# Patient Record
Sex: MALE | Race: WHITE | NOT HISPANIC OR LATINO | URBAN - METROPOLITAN AREA
[De-identification: names, ages, dates, MRNs, and addresses within clinical notes are randomized per-mention and may not be internally consistent; named-entity substitution may affect disease eponyms.]

---

## 2018-05-22 ENCOUNTER — OFFICE (OUTPATIENT)
Dept: URBAN - METROPOLITAN AREA CLINIC 75 | Facility: CLINIC | Age: 18
End: 2018-05-22

## 2018-05-22 VITALS
WEIGHT: 186 LBS | SYSTOLIC BLOOD PRESSURE: 120 MMHG | HEART RATE: 61 BPM | DIASTOLIC BLOOD PRESSURE: 60 MMHG | HEIGHT: 74 IN

## 2018-05-22 DIAGNOSIS — R11.2 NAUSEA WITH VOMITING, UNSPECIFIED: ICD-10-CM

## 2018-05-22 DIAGNOSIS — R10.13 EPIGASTRIC PAIN: ICD-10-CM

## 2018-05-22 DIAGNOSIS — Z83.71 FAMILY HISTORY OF COLONIC POLYPS: ICD-10-CM

## 2018-05-22 PROCEDURE — 99205 OFFICE O/P NEW HI 60 MIN: CPT | Performed by: INTERNAL MEDICINE

## 2018-05-22 NOTE — SERVICEHPINOTES
this 18-year-old male has a history over the last 2 years of worsening symptoms of nausea vomiting and diarrhea.  His nausea is worse in a.m. when he first wakes up as his vomiting and he does feel better after that.  Most times she brings up just bile but he occasionally will bring up old undigested food.  He also feels better if he eats something after this was started.  This is gone for about once a month to one to 2 times a week and usually tried antacids and no other medications thus far.  He also will have loose bowel movements described as diarrhea and when he awakens he will have loose watery bowel movements that he describes as a [place] type VII and then later in the day they will be a [place] type 3 or 4. he otherwise denies weight loss and he stays within a 15 pound range.  He has no significant past medical history is ever been evaluated for this.  Pertinent positive symptoms include fatigue, loss of appetite, weight loss, diarrhea, nausea, vomiting.

## 2018-05-22 NOTE — SERVICENOTES
PPI or other therapy pending endoscopic findings
consider GES, ultrasound, HIDA scan pending above findings

## 2018-07-10 VITALS
HEART RATE: 48 BPM | SYSTOLIC BLOOD PRESSURE: 96 MMHG | OXYGEN SATURATION: 92 % | RESPIRATION RATE: 18 BRPM | DIASTOLIC BLOOD PRESSURE: 65 MMHG | OXYGEN SATURATION: 94 % | RESPIRATION RATE: 14 BRPM | TEMPERATURE: 98 F | SYSTOLIC BLOOD PRESSURE: 115 MMHG | SYSTOLIC BLOOD PRESSURE: 122 MMHG | DIASTOLIC BLOOD PRESSURE: 77 MMHG | TEMPERATURE: 97.8 F | OXYGEN SATURATION: 97 % | RESPIRATION RATE: 20 BRPM | SYSTOLIC BLOOD PRESSURE: 103 MMHG | HEART RATE: 62 BPM | RESPIRATION RATE: 15 BRPM | OXYGEN SATURATION: 99 % | HEART RATE: 61 BPM | RESPIRATION RATE: 16 BRPM | SYSTOLIC BLOOD PRESSURE: 90 MMHG | SYSTOLIC BLOOD PRESSURE: 138 MMHG | WEIGHT: 185 LBS | HEART RATE: 59 BPM | SYSTOLIC BLOOD PRESSURE: 102 MMHG | DIASTOLIC BLOOD PRESSURE: 62 MMHG | DIASTOLIC BLOOD PRESSURE: 57 MMHG | HEIGHT: 74 IN | DIASTOLIC BLOOD PRESSURE: 64 MMHG | DIASTOLIC BLOOD PRESSURE: 69 MMHG | DIASTOLIC BLOOD PRESSURE: 58 MMHG | SYSTOLIC BLOOD PRESSURE: 95 MMHG | HEART RATE: 74 BPM | HEART RATE: 76 BPM | DIASTOLIC BLOOD PRESSURE: 73 MMHG | HEART RATE: 71 BPM | OXYGEN SATURATION: 100 % | SYSTOLIC BLOOD PRESSURE: 126 MMHG | RESPIRATION RATE: 19 BRPM | OXYGEN SATURATION: 98 % | HEART RATE: 104 BPM

## 2018-07-13 ENCOUNTER — AMBULATORY SURGICAL CENTER (OUTPATIENT)
Dept: URBAN - METROPOLITAN AREA SURGERY 17 | Facility: SURGERY | Age: 18
End: 2018-07-13
Payer: COMMERCIAL

## 2018-07-13 ENCOUNTER — OFFICE (OUTPATIENT)
Dept: URBAN - METROPOLITAN AREA PATHOLOGY 4 | Facility: PATHOLOGY | Age: 18
End: 2018-07-13

## 2018-07-13 DIAGNOSIS — R10.13 EPIGASTRIC PAIN: ICD-10-CM

## 2018-07-13 DIAGNOSIS — Z83.71 FAMILY HISTORY OF COLONIC POLYPS: ICD-10-CM

## 2018-07-13 DIAGNOSIS — R11.2 NAUSEA WITH VOMITING, UNSPECIFIED: ICD-10-CM

## 2018-07-13 DIAGNOSIS — R19.7 DIARRHEA, UNSPECIFIED: ICD-10-CM

## 2018-07-13 LAB
GI HISTOLOGY: A. UNSPECIFIED: (no result)
GI HISTOLOGY: B. UNSPECIFIED: (no result)
GI HISTOLOGY: C. SELECT: (no result)
GI HISTOLOGY: PDF REPORT: (no result)

## 2018-07-13 PROCEDURE — 45380 COLONOSCOPY AND BIOPSY: CPT | Performed by: INTERNAL MEDICINE

## 2018-07-13 PROCEDURE — 43239 EGD BIOPSY SINGLE/MULTIPLE: CPT | Performed by: INTERNAL MEDICINE

## 2018-07-13 PROCEDURE — 88305 TISSUE EXAM BY PATHOLOGIST: CPT | Performed by: INTERNAL MEDICINE

## 2018-07-13 RX ADMIN — PROPOFOL 50 MG: 10 INJECTION, EMULSION INTRAVENOUS at 12:08

## 2018-07-13 RX ADMIN — PROPOFOL 50 MG: 10 INJECTION, EMULSION INTRAVENOUS at 12:17

## 2018-07-13 RX ADMIN — PROPOFOL 50 MG: 10 INJECTION, EMULSION INTRAVENOUS at 12:09

## 2018-07-13 RX ADMIN — PROPOFOL 200 MG: 10 INJECTION, EMULSION INTRAVENOUS at 11:59

## 2018-07-13 RX ADMIN — PROPOFOL 100 MG: 10 INJECTION, EMULSION INTRAVENOUS at 11:59

## 2018-08-01 ENCOUNTER — OFFICE (OUTPATIENT)
Dept: URBAN - METROPOLITAN AREA CLINIC 75 | Facility: CLINIC | Age: 18
End: 2018-08-01

## 2018-08-01 VITALS
SYSTOLIC BLOOD PRESSURE: 162 MMHG | HEART RATE: 50 BPM | DIASTOLIC BLOOD PRESSURE: 60 MMHG | WEIGHT: 180 LBS | HEIGHT: 74 IN

## 2018-08-01 DIAGNOSIS — R10.13 EPIGASTRIC PAIN: ICD-10-CM

## 2018-08-01 DIAGNOSIS — R19.7 DIARRHEA, UNSPECIFIED: ICD-10-CM

## 2018-08-01 DIAGNOSIS — R63.4 ABNORMAL WEIGHT LOSS: ICD-10-CM

## 2018-08-01 DIAGNOSIS — R53.83 OTHER FATIGUE: ICD-10-CM

## 2018-08-01 DIAGNOSIS — R11.2 NAUSEA WITH VOMITING, UNSPECIFIED: ICD-10-CM

## 2018-08-01 PROCEDURE — 99214 OFFICE O/P EST MOD 30 MIN: CPT

## 2018-08-01 RX ORDER — HYOSCYAMINE SULFATE 0.12 MG/1
TABLET ORAL; SUBLINGUAL
Qty: 30 | Refills: 1 | Status: COMPLETED
Start: 2018-08-01 | End: 2018-10-17

## 2018-10-17 ENCOUNTER — OFFICE (OUTPATIENT)
Dept: URBAN - METROPOLITAN AREA CLINIC 75 | Facility: CLINIC | Age: 18
End: 2018-10-17

## 2018-10-17 VITALS
SYSTOLIC BLOOD PRESSURE: 122 MMHG | DIASTOLIC BLOOD PRESSURE: 70 MMHG | HEART RATE: 80 BPM | WEIGHT: 178 LBS | HEIGHT: 74 IN

## 2018-10-17 DIAGNOSIS — R63.4 ABNORMAL WEIGHT LOSS: ICD-10-CM

## 2018-10-17 DIAGNOSIS — K31.84 GASTROPARESIS: ICD-10-CM

## 2018-10-17 DIAGNOSIS — R10.13 EPIGASTRIC PAIN: ICD-10-CM

## 2018-10-17 DIAGNOSIS — R19.7 DIARRHEA, UNSPECIFIED: ICD-10-CM

## 2018-10-17 DIAGNOSIS — R11.2 NAUSEA WITH VOMITING, UNSPECIFIED: ICD-10-CM

## 2018-10-17 PROCEDURE — 99213 OFFICE O/P EST LOW 20 MIN: CPT

## 2018-10-17 RX ORDER — HYOSCYAMINE SULFATE 0.12 MG/1
TABLET ORAL; SUBLINGUAL
Qty: 30 | Refills: 1 | Status: COMPLETED
Start: 2018-08-01 | End: 2018-10-17

## 2018-10-17 RX ORDER — PANTOPRAZOLE SODIUM 40 MG/1
40 TABLET, DELAYED RELEASE ORAL
Qty: 30 | Refills: 1 | Status: ACTIVE
Start: 2018-10-17

## 2018-10-17 RX ORDER — AZITHROMYCIN 250 MG/1
500 TABLET, FILM COATED ORAL
Qty: 28 | Refills: 0 | Status: ACTIVE
Start: 2018-10-17

## 2018-10-17 RX ORDER — ONDANSETRON 4 MG/1
12 TABLET, ORALLY DISINTEGRATING ORAL
Qty: 30 | Refills: 1 | Status: ACTIVE
Start: 2018-10-17

## 2022-09-13 ENCOUNTER — APPOINTMENT (OUTPATIENT)
Dept: MRI IMAGING | Facility: HOSPITAL | Age: 22
End: 2022-09-13

## 2022-09-13 ENCOUNTER — HOSPITAL ENCOUNTER (EMERGENCY)
Facility: HOSPITAL | Age: 22
Discharge: HOME | End: 2022-09-13
Attending: EMERGENCY MEDICINE | Admitting: EMERGENCY MEDICINE

## 2022-09-13 VITALS
DIASTOLIC BLOOD PRESSURE: 80 MMHG | RESPIRATION RATE: 16 BRPM | SYSTOLIC BLOOD PRESSURE: 123 MMHG | TEMPERATURE: 97 F | WEIGHT: 177 LBS | BODY MASS INDEX: 23.46 KG/M2 | HEART RATE: 78 BPM | OXYGEN SATURATION: 99 % | HEIGHT: 73 IN

## 2022-09-13 DIAGNOSIS — R56.9 SEIZURE: Primary | ICD-10-CM

## 2022-09-13 LAB
AMPHET+METHAMPHET UR QL: NEGATIVE
ANION GAP SERPL CALCULATED.3IONS-SCNC: 13 MMOL/L (ref 5–15)
BARBITURATES UR QL SCN: NEGATIVE
BASOPHILS # BLD AUTO: 0 10*3/MM3 (ref 0–0.2)
BASOPHILS NFR BLD AUTO: 0.3 % (ref 0–1.5)
BENZODIAZ UR QL SCN: NEGATIVE
BUN SERPL-MCNC: 16 MG/DL (ref 6–20)
BUN/CREAT SERPL: 17 (ref 7–25)
CALCIUM SPEC-SCNC: 10 MG/DL (ref 8.6–10.5)
CANNABINOIDS SERPL QL: POSITIVE
CHLORIDE SERPL-SCNC: 99 MMOL/L (ref 98–107)
CO2 SERPL-SCNC: 26 MMOL/L (ref 22–29)
COCAINE UR QL: NEGATIVE
CREAT SERPL-MCNC: 0.94 MG/DL (ref 0.76–1.27)
DEPRECATED RDW RBC AUTO: 42.4 FL (ref 37–54)
EGFRCR SERPLBLD CKD-EPI 2021: 116.1 ML/MIN/1.73
EOSINOPHIL # BLD AUTO: 0 10*3/MM3 (ref 0–0.4)
EOSINOPHIL NFR BLD AUTO: 0.1 % (ref 0.3–6.2)
ERYTHROCYTE [DISTWIDTH] IN BLOOD BY AUTOMATED COUNT: 13.4 % (ref 12.3–15.4)
GLUCOSE SERPL-MCNC: 123 MG/DL (ref 65–99)
HCT VFR BLD AUTO: 45.5 % (ref 37.5–51)
HGB BLD-MCNC: 15.2 G/DL (ref 13–17.7)
LYMPHOCYTES # BLD AUTO: 1.2 10*3/MM3 (ref 0.7–3.1)
LYMPHOCYTES NFR BLD AUTO: 7.4 % (ref 19.6–45.3)
MCH RBC QN AUTO: 30.7 PG (ref 26.6–33)
MCHC RBC AUTO-ENTMCNC: 33.4 G/DL (ref 31.5–35.7)
MCV RBC AUTO: 91.9 FL (ref 79–97)
METHADONE UR QL SCN: NEGATIVE
MONOCYTES # BLD AUTO: 0.9 10*3/MM3 (ref 0.1–0.9)
MONOCYTES NFR BLD AUTO: 5.7 % (ref 5–12)
NEUTROPHILS NFR BLD AUTO: 14.1 10*3/MM3 (ref 1.7–7)
NEUTROPHILS NFR BLD AUTO: 86.5 % (ref 42.7–76)
NRBC BLD AUTO-RTO: 0 /100 WBC (ref 0–0.2)
OPIATES UR QL: NEGATIVE
OXYCODONE UR QL SCN: NEGATIVE
PLATELET # BLD AUTO: 333 10*3/MM3 (ref 140–450)
PMV BLD AUTO: 7.3 FL (ref 6–12)
POTASSIUM SERPL-SCNC: 3.9 MMOL/L (ref 3.5–5.2)
RBC # BLD AUTO: 4.95 10*6/MM3 (ref 4.14–5.8)
SODIUM SERPL-SCNC: 138 MMOL/L (ref 136–145)
WBC NRBC COR # BLD: 16.3 10*3/MM3 (ref 3.4–10.8)

## 2022-09-13 PROCEDURE — 80307 DRUG TEST PRSMV CHEM ANLYZR: CPT | Performed by: EMERGENCY MEDICINE

## 2022-09-13 PROCEDURE — A9579 GAD-BASE MR CONTRAST NOS,1ML: HCPCS | Performed by: EMERGENCY MEDICINE

## 2022-09-13 PROCEDURE — 0 LEVETIRACETAM IN NACL 0.75% 1000 MG/100ML SOLUTION: Performed by: EMERGENCY MEDICINE

## 2022-09-13 PROCEDURE — 25010000002 KETOROLAC TROMETHAMINE PER 15 MG: Performed by: EMERGENCY MEDICINE

## 2022-09-13 PROCEDURE — 80048 BASIC METABOLIC PNL TOTAL CA: CPT | Performed by: EMERGENCY MEDICINE

## 2022-09-13 PROCEDURE — 96375 TX/PRO/DX INJ NEW DRUG ADDON: CPT

## 2022-09-13 PROCEDURE — 25010000002 GADOTERIDOL PER 1 ML: Performed by: EMERGENCY MEDICINE

## 2022-09-13 PROCEDURE — 99283 EMERGENCY DEPT VISIT LOW MDM: CPT

## 2022-09-13 PROCEDURE — 96374 THER/PROPH/DIAG INJ IV PUSH: CPT

## 2022-09-13 PROCEDURE — 85025 COMPLETE CBC W/AUTO DIFF WBC: CPT | Performed by: EMERGENCY MEDICINE

## 2022-09-13 PROCEDURE — 70553 MRI BRAIN STEM W/O & W/DYE: CPT

## 2022-09-13 RX ORDER — KETOROLAC TROMETHAMINE 30 MG/ML
30 INJECTION, SOLUTION INTRAMUSCULAR; INTRAVENOUS ONCE
Status: COMPLETED | OUTPATIENT
Start: 2022-09-13 | End: 2022-09-13

## 2022-09-13 RX ORDER — LEVETIRACETAM 10 MG/ML
1000 INJECTION INTRAVASCULAR ONCE
Status: COMPLETED | OUTPATIENT
Start: 2022-09-13 | End: 2022-09-13

## 2022-09-13 RX ORDER — SODIUM CHLORIDE 0.9 % (FLUSH) 0.9 %
10 SYRINGE (ML) INJECTION AS NEEDED
Status: DISCONTINUED | OUTPATIENT
Start: 2022-09-13 | End: 2022-09-13 | Stop reason: HOSPADM

## 2022-09-13 RX ORDER — ONDANSETRON 4 MG/1
4 TABLET, ORALLY DISINTEGRATING ORAL EVERY 8 HOURS PRN
Qty: 10 TABLET | Refills: 0 | Status: SHIPPED | OUTPATIENT
Start: 2022-09-13

## 2022-09-13 RX ORDER — LEVETIRACETAM 500 MG/1
500 TABLET ORAL 2 TIMES DAILY
Qty: 60 TABLET | Refills: 0 | Status: SHIPPED | OUTPATIENT
Start: 2022-09-13 | End: 2022-09-20 | Stop reason: SDUPTHER

## 2022-09-13 RX ADMIN — GADOTERIDOL 17 ML: 279.3 INJECTION, SOLUTION INTRAVENOUS at 16:43

## 2022-09-13 RX ADMIN — KETOROLAC TROMETHAMINE 30 MG: 30 INJECTION, SOLUTION INTRAMUSCULAR at 14:23

## 2022-09-13 RX ADMIN — LEVETIRACETAM 1000 MG: 10 INJECTION INTRAVENOUS at 16:55

## 2022-09-13 NOTE — ED PROVIDER NOTES
Subjective   PIT    Patient is a 22-year-old male who had a witnessed tonic-clonic seizure this morning lasting 5 to 7 minutes per his girlfriend.  The patient had a prolonged postictal period lasting approximately an hour.  He complains of severe headache at this time.  Patient apparently had a probable seizure 1 month ago which was not witnessed but he did have a postictal period at that time.  Was seen at an outlying hospital and had a normal evaluation, normal CT scan of his head and normal laboratory data.          Review of Systems   Constitutional: Negative for chills and fever.   HENT: Negative for congestion and sore throat.    Eyes: Negative for visual disturbance.   Respiratory: Negative for cough and shortness of breath.    Cardiovascular: Negative for chest pain and palpitations.   Gastrointestinal: Negative for abdominal pain, diarrhea and vomiting.   Endocrine: Negative for polyuria.   Genitourinary: Negative for dysuria and flank pain.   Musculoskeletal: Negative for back pain.   Neurological: Positive for headaches. Negative for dizziness and weakness.   Psychiatric/Behavioral: Positive for confusion.   A complete review of systems was obtained and is otherwise negative    No past medical history on file.    No Known Allergies    No past surgical history on file.    No family history on file.    Social History     Socioeconomic History   • Marital status: Single           Objective   Physical Exam  Neurologic exam is nonfocal.  HEENT exam shows TMs to be clear  Oropharynx is clear and moist.  Neck adenopathy JVD or bruits.  Lungs clear.  Heart has regular rhythm without murmur.  Abdomen is soft nontender.  Patient has normal bowel sounds.  Back no CVA tenderness.  Extremities M is no cyanosis or edema.  Procedures           ED Course  ED Course as of 09/13/22 1859 Tue Sep 13, 2022   1716 Patient resting, no acute distress.  He is talkative.  We discussed discharge plan of care. [LB]      ED Course  User Index  [LB] Yashira Correa, APRN      Results for orders placed or performed during the hospital encounter of 09/13/22   Basic Metabolic Panel    Specimen: Blood   Result Value Ref Range    Glucose 123 (H) 65 - 99 mg/dL    BUN 16 6 - 20 mg/dL    Creatinine 0.94 0.76 - 1.27 mg/dL    Sodium 138 136 - 145 mmol/L    Potassium 3.9 3.5 - 5.2 mmol/L    Chloride 99 98 - 107 mmol/L    CO2 26.0 22.0 - 29.0 mmol/L    Calcium 10.0 8.6 - 10.5 mg/dL    BUN/Creatinine Ratio 17.0 7.0 - 25.0    Anion Gap 13.0 5.0 - 15.0 mmol/L    eGFR 116.1 >60.0 mL/min/1.73   Urine Drug Screen - Urine, Clean Catch    Specimen: Urine, Clean Catch   Result Value Ref Range    Amphet/Methamphet, Screen Negative Negative    Barbiturates Screen, Urine Negative Negative    Benzodiazepine Screen, Urine Negative Negative    Cocaine Screen, Urine Negative Negative    Opiate Screen Negative Negative    THC, Screen, Urine Positive (A) Negative    Methadone Screen, Urine Negative Negative    Oxycodone Screen, Urine Negative Negative   CBC Auto Differential    Specimen: Blood   Result Value Ref Range    WBC 16.30 (H) 3.40 - 10.80 10*3/mm3    RBC 4.95 4.14 - 5.80 10*6/mm3    Hemoglobin 15.2 13.0 - 17.7 g/dL    Hematocrit 45.5 37.5 - 51.0 %    MCV 91.9 79.0 - 97.0 fL    MCH 30.7 26.6 - 33.0 pg    MCHC 33.4 31.5 - 35.7 g/dL    RDW 13.4 12.3 - 15.4 %    RDW-SD 42.4 37.0 - 54.0 fl    MPV 7.3 6.0 - 12.0 fL    Platelets 333 140 - 450 10*3/mm3    Neutrophil % 86.5 (H) 42.7 - 76.0 %    Lymphocyte % 7.4 (L) 19.6 - 45.3 %    Monocyte % 5.7 5.0 - 12.0 %    Eosinophil % 0.1 (L) 0.3 - 6.2 %    Basophil % 0.3 0.0 - 1.5 %    Neutrophils, Absolute 14.10 (H) 1.70 - 7.00 10*3/mm3    Lymphocytes, Absolute 1.20 0.70 - 3.10 10*3/mm3    Monocytes, Absolute 0.90 0.10 - 0.90 10*3/mm3    Eosinophils, Absolute 0.00 0.00 - 0.40 10*3/mm3    Basophils, Absolute 0.00 0.00 - 0.20 10*3/mm3    nRBC 0.0 0.0 - 0.2 /100 WBC                                          MDM  Number of Diagnoses or  Management Options  Diagnosis management comments: Patient has nonfocal neurologic exam.  Laboratory data is normal.  I did speak to the on-call neurologist.  Patient will have an MRI scan of his brain before discharge provided his MRI is normal.  He was given Keppra IV and was placed on Keppra outpatient as well.  Will follow with outpatient neurology again pending his MRI scan at this time.       Amount and/or Complexity of Data Reviewed  Clinical lab tests: reviewed    Risk of Complications, Morbidity, and/or Mortality  Presenting problems: high  Diagnostic procedures: high  Management options: high    Assumed care patient previous provider.  MRI pending.  MRI reveals no acute normality.  Patient is awake alert without focal neurological deficit.  Discussed discharge instruction, plan of care, home medications and follow-up with patient and family at the bedside.  They verbalized understanding.  They will follow-up with neurology.  I spoke with the patient at the bedside regarding their plan of care, discharge instruction, home care, prescriptions, indications to return to the emergency department, and importance follow-up.  We discussed test results at the bedside, including incidental abnormal labs, radiological findings, understands need for follow-up with primary care or specialist if indicated. Pt is aware that discharge does not mean that nothing is wrong but it indicates no emergency is present and they must continue care with follow-up as given below or physician of their choice        Final diagnoses:   Seizure (HCC)       ED Disposition  ED Disposition     ED Disposition   Discharge    Condition   Stable    Comment   --             [facility] EMERGENCY DEPARTMENT  [facility] [address] [phone]    As needed, If symptoms worsen    Vicente Hill MD  [address] [address] [phone]          Seipel, Joseph F, MD  [facility]  [place] [address]  [phone]               Medication List      New Prescriptions    levETIRAcetam 500 MG tablet  Commonly known as: KEPPRA  Take 1 tablet by mouth 2 (Two) Times a Day.     ondansetron ODT 4 MG disintegrating tablet  Commonly known as: ZOFRAN-ODT  Place 1 tablet on the tongue Every 8 (Eight) Hours As Needed for Nausea or Vomiting.           Where to Get Your Medications      These medications were sent to [facility]/pharmacy #[id number] - [place], [place] - [address] AT [facility] - [phone]  - [phone]   [address] [address]    Phone: [phone]   · levETIRAcetam 500 MG tablet  · ondansetron ODT 4 MG disintegrating tablet          Yashira Correa, APRN  09/13/22 0322

## 2022-09-13 NOTE — DISCHARGE INSTRUCTIONS
Please follow-up with your primary care provider, if you not have a primary care provider please utilize patient connection above to establish care  Return to the ED for new or worsening symptoms  Follow up with Specialist if indicated above-call for an appointment-call for neurology, call for appointment as above  No driving, antibiotics, swimming until cleared per neurology, and per [facility] regulations.

## 2022-09-19 ENCOUNTER — TELEPHONE (OUTPATIENT)
Dept: NEUROLOGY | Facility: CLINIC | Age: 22
End: 2022-09-19

## 2022-09-20 ENCOUNTER — OFFICE VISIT (OUTPATIENT)
Dept: NEUROLOGY | Facility: CLINIC | Age: 22
End: 2022-09-20

## 2022-09-20 VITALS
SYSTOLIC BLOOD PRESSURE: 118 MMHG | BODY MASS INDEX: 24.65 KG/M2 | WEIGHT: 186 LBS | HEIGHT: 73 IN | HEART RATE: 72 BPM | DIASTOLIC BLOOD PRESSURE: 78 MMHG | OXYGEN SATURATION: 96 %

## 2022-09-20 DIAGNOSIS — R56.9 NEW ONSET SEIZURE: Primary | ICD-10-CM

## 2022-09-20 PROCEDURE — 99204 OFFICE O/P NEW MOD 45 MIN: CPT | Performed by: PSYCHIATRY & NEUROLOGY

## 2022-09-20 RX ORDER — LEVETIRACETAM 500 MG/1
500 TABLET ORAL 2 TIMES DAILY
Qty: 60 TABLET | Refills: 2 | Status: SHIPPED | OUTPATIENT
Start: 2022-09-20 | End: 2022-12-27 | Stop reason: SDUPTHER

## 2022-09-20 NOTE — PATIENT INSTRUCTIONS
[facility]  Zaira Hoyt MD  Neurology clinic  [phone]    With anti-seizure medications, you may initially notice side effects of fatigue, drowsiness, unsteadiness, and dizziness.  Other possible side effects include nausea, abdominal pain, headache, blurry or double vision, slurred speech and mood changes.  Generally, patients will noticed these symptoms when the medication is first started or with higher doses and will go away with time.    It is import to consistently take your medication every day.  Missing just one dose may put you at risk for a breakthrough seizure.  Consider using reminders on your phone or a pill box.    If you develop a rash, please call the neurology clinic immediately or notify another healthcare professional, as this may be potentially life-threatening.  If you are unable to reach a healthcare professional, go to the emergency room immediately for further evaluation.    If you develop thoughts of wanting to hurt yourself or others, please call the neurology clinic immediately to notify another healthcare professional.  If you are unable to reach a healthcare professional, go to the emergency room immediately for further evaluation.    It is the [place] state law that you cannot drive within 90 days of a seizure.    You should avoid certain activities that if you were to have a seizure, you could harm yourself or others. In general, it is recommended that you avoid operating heavy machinery or power tools, swimming or taking baths by yourself (showers are ok), don't stand over open flames, don't get on high ladders or the roof.  I also recommend to avoid sleeping on your stomach.    For further information on epilepsy and resources available to patients and their families, please visit the [facility] at www.efky.org or call [phone].    **Check out the [facility]'s monthly Art Group Gathering.  They are located  at [facility], [address] [place] [address].  Call Leigh Ann Reaves at [phone] or email her at [email or web address] for the dates of future gatherings.**      **If you have having memory problems, consider HOBSCOTCH (Home-Based Self-management and Cognitive Training Changes lives).  It is an 8 week self-management program for adults with epilepsy and memory problems.  The program is free at the [facility].  Contact Charlene Bajwa at [phone] or [email or web address].**         In general, we recommend using good judgement when you are doing certain activities and to avoid those activities that if you were to have a seizure, you could harm yourself or others. In the [facility], it is the law that you cannot drive within 90 days of a seizure. We also recommend not standing over open flames, not getting on high ladders or the roof, not swimming or taking baths by yourself (showers are ok) and not operating heavy machinery or power tools.

## 2022-09-20 NOTE — PROGRESS NOTES
Chief Complaint  Patient presents today with their mother, Shaan and has given consent to give health information to them.   Seizures    Subjective          Iván Butler presents to [facility] NEUROLOGY for   HISTORY OF PRESENT ILLNESS:    Iván Butler is a 22 year old right handed man who presents with his mother, Shaan, to neurology clinic for initial evaluation and treatment of new onset seizures.  He tells me his symptoms start in the morning with an intense anxiety and dejavu sensation and he feels like he has some weird nauseated sensation in his stomach which may have started since he was in 3rd grade with morning vomiting and anxiety.  Last Tuesday and 3 weeks before that on August 9th his girl friend noticed that he will be having staring spells after which he will be confused and can be aggressive.  He had a brain MRI scan done on 9/13/2022 which I reviewed the images independently on his visit today and looks normal.  He has not had an EEG done.  He was started on levetiracetam 500 mg BID which he is now taking without any side effects.  He still has some trouble with getting words out and forming sentences since his seizure.  He tells me he has this underlying anxiety and he has had light sensitivity.  There is no family history of seizures in his immediate family but Shaan has a cousin who was diagnosed with epilepsy as a teenager.  Last Tuesday supposedly patient's girl friend was woken up by patient convulsing or shaking.  He tells me he reportedly lost bladder with this spell.  He does not have any history of childhood febrile seizures.  No history of meningitis or encephalitis.  No history of concussion or brain injury.  He smokes marijuana occasionally.  He drinks 2-3 beers per week.  No increased stress. No lack of sleep.  He tells me both times he had a seizure he was vomiting.      Past Medical History:   Diagnosis Date   • Gastroparesis         Family History   Problem  "Relation Age of Onset   • Dementia Maternal Grandfather         Social History     Socioeconomic History   • Marital status: Single   Tobacco Use   • Smoking status: Never Smoker   • Smokeless tobacco: Never Used   Substance and Sexual Activity   • Alcohol use: Yes     Comment: social        I have reviewed and confirmed the accuracy of the ROS as documented by the MA/LPN/RN Zaira Hoyt MD    Review of Systems   Constitutional: Negative for activity change, appetite change and fatigue.   HENT: Negative for facial swelling, trouble swallowing and voice change.    Eyes: Positive for photophobia. Negative for pain and visual disturbance.   Respiratory: Negative for chest tightness, shortness of breath and wheezing.    Cardiovascular: Positive for leg swelling (ankles). Negative for chest pain and palpitations.   Gastrointestinal: Negative for abdominal pain, nausea and vomiting.   Endocrine: Negative for cold intolerance and heat intolerance.   Musculoskeletal: Positive for gait problem. Negative for arthralgias, back pain, joint swelling, myalgias, neck pain and neck stiffness.   Neurological: Positive for memory problem and confusion. Negative for dizziness, tremors, seizures, syncope, facial asymmetry, speech difficulty, weakness, light-headedness, numbness and headache.   Hematological: Does not bruise/bleed easily.   Psychiatric/Behavioral: Positive for positive for hyperactivity and stress. Negative for agitation, behavioral problems, decreased concentration, dysphoric mood, hallucinations, self-injury, sleep disturbance, suicidal ideas and depressed mood. The patient is nervous/anxious.         Objective   Vital Signs:   /78 (BP Location: Left arm, Patient Position: Sitting, Cuff Size: Adult)   Pulse 72   Ht 185.4 cm (73\")   Wt 84.4 kg (186 lb)   SpO2 96%   BMI 24.54 kg/m²       PHYSICAL EXAM:    General   Mental Status - Alert. General Appearance - Well developed, Well groomed, Oriented and " Cooperative. Orientation - Oriented X3.       Head and Neck  Head - normocephalic, atraumatic with no lesions or palpable masses.  Neck    Global Assessment - supple.       Eye   Sclera/Conjunctiva - Bilateral - Normal.    ENMT  Mouth and Throat   Oral Cavity/Oropharynx: Oropharynx - the soft palate,uvula and tongue are normal in appearance.    Chest and Lung Exam   Chest - lung clear to auscultation bilaterally.    Cardiovascular   Cardiovascular examination reveals  - normal heart sounds, regular rate and rhythm.    Neurologic   Mental Status: Speech - Normal. Cognitive function - appropriate fund of knowledge. No impairment of attention, Impairment of concentration, impairment of long term memory or impairment of short term memory.  Cranial Nerves:   II Optic: Visual acuity - Left - Normal. Right - Normal. Visual fields - Normal (to confrontation).  III Oculomotor: Pupillary constriction - Left - Normal. Right - Normal.  VII Facial: - Normal Bilaterally.   IX Glossopharyngeal / X Vagus - Normal.  XI Accessory: Trapezius - Bilateral - Normal. Sternocleidomastoid - Bilateral - Normal.  XII Hypoglossal - Bilateral - Normal.  Eye Movements: - Normal Bilaterally.  Sensory:   Light Touch: Intact - Globally.  Motor:   Bulk and Contour: - Normal.  Tone: - Normal.  Tremor: Not present.  Strength: 5/5 normal muscle strength - All Muscles.   General Assessment of Reflexes: - deep tendon reflexes are normal. Coordination - No Impairment of finger-to-nose or Impairment of rapid alternating movements. Gait - Normal.      Result Review :                 Assessment and Plan    Problem List Items Addressed This Visit        Neuro    New onset seizure (HCC) - Primary    Current Assessment & Plan     22 year old right handed man with new onset seizures.  He tells me his symptoms start in the morning with an intense anxiety and dejavu sensation and he feels like he has some weird nauseated sensation in his stomach which may have  started since he was in 3rd grade with morning vomiting and anxiety.  Last Tuesday and 3 weeks before that on August 9th his girl friend noticed that he will be having staring spells after which he will be confused and can be aggressive.  He had a brain MRI scan done on 9/13/2022 which I reviewed the images independently on his visit today and looks normal.  He has not had an EEG done.  He was started on levetiracetam 500 mg BID which he is now taking without any side effects.  He still has some trouble with getting words out and forming sentences since his seizure.  He tells me he has this underlying anxiety and he has had light sensitivity.  There is no family history of seizures in his immediate family but Shaan has a cousin who was diagnosed with epilepsy as a teenager.  Last Tuesday supposedly patient's girl friend was woken up by patient convulsing or shaking.  He tells me he reportedly lost bladder with this spell.  He does not have any history of childhood febrile seizures.  No history of meningitis or encephalitis.  No history of concussion or brain injury.  He smokes marijuana occasionally.  He drinks 2-3 beers per week.  No increased stress. No lack of sleep.  He tells me both times he had a seizure he was vomiting.  I will order a prolonged EEG for further evaluation of his underlying risk for epilepsy.  If I do not see anything I will refer him to have EMU evaluation where his seizure medications can be safely tapered to see if he has any risk for epilepsy.  Discussed seizure precautions including not driving for at least 3 months following his most recent seizure, taking showers as opposed to baths and staying off of elevated places and heights.  Provided patient education information on seizure today.          Relevant Medications    levETIRAcetam (KEPPRA) 500 MG tablet          I spent 45 minutes caring for Iván on this date of service. This time includes time spent by me in the following  activities:preparing for the visit, reviewing tests, obtaining and/or reviewing a separately obtained history, performing a medically appropriate examination and/or evaluation , counseling and educating the patient/family/caregiver, ordering medications, tests, or procedures, documenting information in the medical record, independently interpreting results and communicating that information with the patient/family/caregiver and care coordination    Follow Up   No follow-ups on file.  Patient was given instructions and counseling regarding his condition or for health maintenance advice. Please see specific information pulled into the AVS if appropriate.

## 2022-09-20 NOTE — ASSESSMENT & PLAN NOTE
22 year old right handed man with new onset seizures.  He tells me his symptoms start in the morning with an intense anxiety and dejavu sensation and he feels like he has some weird nauseated sensation in his stomach which may have started since he was in 3rd grade with morning vomiting and anxiety.  Last Tuesday and 3 weeks before that on August 9th his girl friend noticed that he will be having staring spells after which he will be confused and can be aggressive.  He had a brain MRI scan done on 9/13/2022 which I reviewed the images independently on his visit today and looks normal.  He has not had an EEG done.  He was started on levetiracetam 500 mg BID which he is now taking without any side effects.  He still has some trouble with getting words out and forming sentences since his seizure.  He tells me he has this underlying anxiety and he has had light sensitivity.  There is no family history of seizures in his immediate family but Shaan has a cousin who was diagnosed with epilepsy as a teenager.  Last Tuesday supposedly patient's girl friend was woken up by patient convulsing or shaking.  He tells me he reportedly lost bladder with this spell.  He does not have any history of childhood febrile seizures.  No history of meningitis or encephalitis.  No history of concussion or brain injury.  He smokes marijuana occasionally.  He drinks 2-3 beers per week.  No increased stress. No lack of sleep.  He tells me both times he had a seizure he was vomiting.  I will order a prolonged EEG for further evaluation of his underlying risk for epilepsy.  If I do not see anything I will refer him to have EMU evaluation where his seizure medications can be safely tapered to see if he has any risk for epilepsy.  Discussed seizure precautions including not driving for at least 3 months following his most recent seizure, taking showers as opposed to baths and staying off of elevated places and heights.  Provided patient education  information on seizure today.

## 2022-09-27 ENCOUNTER — APPOINTMENT (OUTPATIENT)
Dept: NEUROLOGY | Facility: HOSPITAL | Age: 22
End: 2022-09-27

## 2022-09-28 ENCOUNTER — PATIENT ROUNDING (BHMG ONLY) (OUTPATIENT)
Dept: NEUROLOGY | Facility: CLINIC | Age: 22
End: 2022-09-28

## 2022-10-06 ENCOUNTER — HOSPITAL ENCOUNTER (OUTPATIENT)
Dept: NEUROLOGY | Facility: HOSPITAL | Age: 22
Discharge: HOME | End: 2022-10-06
Admitting: PSYCHIATRY & NEUROLOGY

## 2022-10-06 DIAGNOSIS — R56.9 NEW ONSET SEIZURE: ICD-10-CM

## 2022-10-06 PROCEDURE — 95713 VEEG 2-12 HR CONT MNTR: CPT

## 2022-10-06 PROCEDURE — 95713 VEEG 2-12 HR CONT MNTR: CPT | Performed by: PSYCHIATRY & NEUROLOGY

## 2022-10-12 DIAGNOSIS — R56.9 SEIZURE-LIKE ACTIVITY: Primary | ICD-10-CM

## 2022-12-27 DIAGNOSIS — R56.9 NEW ONSET SEIZURE: ICD-10-CM

## 2022-12-27 RX ORDER — LEVETIRACETAM 500 MG/1
500 TABLET ORAL 2 TIMES DAILY
Qty: 60 TABLET | Refills: 2 | Status: SHIPPED | OUTPATIENT
Start: 2022-12-27 | End: 2023-03-31

## 2023-03-31 DIAGNOSIS — R56.9 NEW ONSET SEIZURE: ICD-10-CM

## 2023-03-31 RX ORDER — LEVETIRACETAM 500 MG/1
TABLET ORAL
Qty: 60 TABLET | Refills: 0 | Status: SHIPPED | OUTPATIENT
Start: 2023-03-31

## 2023-05-05 DIAGNOSIS — R56.9 NEW ONSET SEIZURE: ICD-10-CM

## 2023-05-05 RX ORDER — LEVETIRACETAM 500 MG/1
TABLET ORAL
Qty: 60 TABLET | Refills: 0 | OUTPATIENT
Start: 2023-05-05

## 2023-05-08 ENCOUNTER — TELEPHONE (OUTPATIENT)
Dept: NEUROLOGY | Facility: CLINIC | Age: 23
End: 2023-05-08
Payer: COMMERCIAL

## 2023-05-08 NOTE — TELEPHONE ENCOUNTER
Pt sent MyChart request for a fu appt for seizure management & med refill. Pt was scheduled on Thursday the 11th at 1:40 pm. MyChart response sent confirming appt.

## 2023-05-11 ENCOUNTER — OFFICE VISIT (OUTPATIENT)
Dept: NEUROLOGY | Facility: CLINIC | Age: 23
End: 2023-05-11
Payer: COMMERCIAL

## 2023-05-11 VITALS
HEIGHT: 73 IN | DIASTOLIC BLOOD PRESSURE: 74 MMHG | HEART RATE: 76 BPM | OXYGEN SATURATION: 98 % | BODY MASS INDEX: 24.25 KG/M2 | WEIGHT: 183 LBS | SYSTOLIC BLOOD PRESSURE: 120 MMHG

## 2023-05-11 DIAGNOSIS — R56.9 NEW ONSET SEIZURE: Primary | ICD-10-CM

## 2023-05-11 PROCEDURE — 99214 OFFICE O/P EST MOD 30 MIN: CPT | Performed by: PSYCHIATRY & NEUROLOGY

## 2023-05-11 RX ORDER — ACETAMINOPHEN, ASPIRIN AND CAFFEINE 250; 250; 65 MG/1; MG/1; MG/1
1 TABLET, FILM COATED ORAL EVERY 6 HOURS PRN
COMMUNITY

## 2023-05-11 RX ORDER — LEVETIRACETAM 750 MG/1
1500 TABLET, EXTENDED RELEASE ORAL DAILY
Qty: 60 TABLET | Refills: 2 | Status: SHIPPED | OUTPATIENT
Start: 2023-05-11

## 2023-05-11 NOTE — PROGRESS NOTES
Chief Complaint  Seizures    Subjective          Iván Butler presents to [facility] NEUROLOGY for   HISTORY OF PRESENT ILLNESS:    Iván Butler is a 22 year old right handed man who presents with his girl friend, Deepti, to neurology clinic for follow up evaluation and treatment of new onset seizures.  He tells me his symptoms start in the morning with an intense anxiety and dejavu sensation and he feels like he has some weird nauseated sensation in his stomach which may have started since he was in 3rd grade with morning vomiting and anxiety.  Last Tuesday and 3 weeks before that on August 9th his girl friend noticed that he will be having staring spells after which he will be confused and can be aggressive.  He had a brain MRI scan done on 9/13/2022 which looks normal.  He had a 3 hour prolonged EEG which was normal.  He was started on levetiracetam 500 mg BID which he is now taking with some agitation and feeling on edge and some fatigue.  He still has some trouble with getting words out and forming sentences since his seizure.  He tells me he has this underlying anxiety and he has had light sensitivity.  There is no family history of seizures in his immediate family but Shaan, his mother, has a cousin who was diagnosed with epilepsy as a teenager.  Last Tuesday supposedly patient's girl friend was woken up by patient convulsing or shaking.  He tells me he reportedly lost bladder with this spell.  He does not have any history of childhood febrile seizures.  No history of meningitis or encephalitis.  No history of concussion or brain injury.  He smokes marijuana occasionally.  He drinks 2-3 beers per week.  No increased stress. No lack of sleep.  He tells me both times he had a seizure he was vomiting.  He tells me since his last visit he had another seizure on 5/7/2023 as he woke up feeling anxious and felt like his throat was closing up and felt like he was getting numb all over and he took  "an additional levetiracetam and 3 minutes later he had rhythmic facial movements lasting about a minute and he opened up his eyes and felt confused and felt tired.      Past Medical History:   Diagnosis Date   • Gastroparesis         Family History   Problem Relation Age of Onset   • Dementia Maternal Grandfather         Social History     Socioeconomic History   • Marital status: Single   Tobacco Use   • Smoking status: Never   • Smokeless tobacco: Never   Substance and Sexual Activity   • Alcohol use: Yes     Comment: social        I have reviewed and confirmed the accuracy of the ROS as documented by the MA/LPN/RN Zaira Hoyt MD    Review of Systems   Neurological: Positive for seizures. Negative for dizziness, tremors, syncope, facial asymmetry, speech difficulty, weakness, light-headedness, numbness, headache, memory problem and confusion.   Psychiatric/Behavioral: Positive for agitation. Negative for behavioral problems, decreased concentration, dysphoric mood, hallucinations, self-injury, sleep disturbance, suicidal ideas, negative for hyperactivity, depressed mood and stress. The patient is not nervous/anxious.         Objective   Vital Signs:   /74   Pulse 76   Ht 185.4 cm (72.99\")   Wt 83 kg (183 lb)   SpO2 98%   BMI 24.15 kg/m²       PHYSICAL EXAM:    General   Mental Status - Alert. General Appearance - Well developed, Well groomed, Oriented and Cooperative. Orientation - Oriented X3.       Head and Neck  Head - normocephalic, atraumatic with no lesions or palpable masses.  Neck    Global Assessment - supple.       Eye   Sclera/Conjunctiva - Bilateral - Normal.    ENMT  Mouth and Throat   Oral Cavity/Oropharynx: Oropharynx - the soft palate,uvula and tongue are normal in appearance.    Chest and Lung Exam   Chest - lung clear to auscultation bilaterally.    Cardiovascular   Cardiovascular examination reveals  - normal heart sounds, regular rate and rhythm.    Neurologic   Mental Status: " Speech - Normal. Cognitive function - appropriate fund of knowledge. No impairment of attention, Impairment of concentration, impairment of long term memory or impairment of short term memory.  Cranial Nerves:   II Optic: Visual acuity - Left - Normal. Right - Normal. Visual fields - Normal (to confrontation).  III Oculomotor: Pupillary constriction - Left - Normal. Right - Normal.  VII Facial: - Normal Bilaterally.   IX Glossopharyngeal / X Vagus - Normal.  XI Accessory: Trapezius - Bilateral - Normal. Sternocleidomastoid - Bilateral - Normal.  XII Hypoglossal - Bilateral - Normal.  Eye Movements: - Normal Bilaterally.  Sensory:   Light Touch: Intact - Globally.  Motor:   Bulk and Contour: - Normal.  Tone: - Normal.  Tremor: Not present.  Strength: 5/5 normal muscle strength - All Muscles.   General Assessment of Reflexes: - deep tendon reflexes are normal. Coordination - No Impairment of finger-to-nose or Impairment of rapid alternating movements. Gait - Normal.      Result Review :                 Assessment and Plan    Problem List Items Addressed This Visit        Neuro    New onset seizure - Primary    Current Assessment & Plan     22 year old right handed man with new onset seizures.  He tells me his symptoms start in the morning with an intense anxiety and dejavu sensation and he feels like he has some weird nauseated sensation in his stomach which may have started since he was in 3rd grade with morning vomiting and anxiety.  Last Tuesday and 3 weeks before that on August 9th his girl friend noticed that he will be having staring spells after which he will be confused and can be aggressive.  He had a brain MRI scan done on 9/13/2022 which looks normal.  He had a 3 hour prolonged EEG which was normal.  He was started on levetiracetam 500 mg BID which he is now taking with some agitation and feeling on edge and some fatigue.  He still has some trouble with getting words out and forming sentences since his  seizure.  He tells me he has this underlying anxiety and he has had light sensitivity.  There is no family history of seizures in his immediate family but Shaan, his mother, has a cousin who was diagnosed with epilepsy as a teenager.  Last Tuesday supposedly patient's girl friend was woken up by patient convulsing or shaking.  He tells me he reportedly lost bladder with this spell.  He does not have any history of childhood febrile seizures.  No history of meningitis or encephalitis.  No history of concussion or brain injury.  He smokes marijuana occasionally.  He drinks 2-3 beers per week.  No increased stress. No lack of sleep.  He tells me both times he had a seizure he was vomiting.  He tells me since his last visit he had another seizure on 5/7/2023 as he woke up feeling anxious and felt like his throat was closing up and felt like he was getting numb all over and he took an additional levetiracetam and 3 minutes later he had rhythmic facial movements lasting about a minute and he opened up his eyes and felt confused and felt tired.  I am going to increase his dose of levetiracetam today to 750 mg BID and try the XR formulation but if too expensive can switch the the regular formulation and set him up for prolonged continuous video EEG monitoring at an EMU where his Keppra can be weaned off to get a good baseline read off of ASDs.  Discussed seizure precautions including taking showers as opposed to baths, staying off of elevated places and heights, no driving for 3 months of seizure freedom and no operating heavy machinery.           Relevant Medications    levETIRAcetam (KEPPRA XR) 750 MG tablet sustained-release 24 hour tablet    Other Relevant Orders    EEG Continuous Monitoring With Video       I spent 35 minutes caring for Iván on this date of service. This time includes time spent by me in the following activities:preparing for the visit, reviewing tests, obtaining and/or reviewing a separately  obtained history, performing a medically appropriate examination and/or evaluation , counseling and educating the patient/family/caregiver, ordering medications, tests, or procedures, documenting information in the medical record and care coordination    Follow Up   No follow-ups on file.  Patient was given instructions and counseling regarding his condition or for health maintenance advice. Please see specific information pulled into the AVS if appropriate.

## 2023-05-11 NOTE — PATIENT INSTRUCTIONS
[facility]  Zaira Hoyt MD  Neurology clinic  [phone]    With anti-seizure medications, you may initially notice side effects of fatigue, drowsiness, unsteadiness, and dizziness.  Other possible side effects include nausea, abdominal pain, headache, blurry or double vision, slurred speech and mood changes.  Generally, patients will noticed these symptoms when the medication is first started or with higher doses and will go away with time.    It is import to consistently take your medication every day.  Missing just one dose may put you at risk for a breakthrough seizure.  Consider using reminders on your phone or a pill box.    If you develop a rash, please call the neurology clinic immediately or notify another healthcare professional, as this may be potentially life-threatening.  If you are unable to reach a healthcare professional, go to the emergency room immediately for further evaluation.    If you develop thoughts of wanting to hurt yourself or others, please call the neurology clinic immediately to notify another healthcare professional.  If you are unable to reach a healthcare professional, go to the emergency room immediately for further evaluation.    It is the [place] state law that you cannot drive within 90 days of a seizure.    You should avoid certain activities that if you were to have a seizure, you could harm yourself or others. In general, it is recommended that you avoid operating heavy machinery or power tools, swimming or taking baths by yourself (showers are ok), don't stand over open flames, don't get on high ladders or the roof.  I also recommend to avoid sleeping on your stomach.    For further information on epilepsy and resources available to patients and their families, please visit the [facility] [place] [facility] at www.efky.org or call [phone].    **Check out the [facility] [place]'s monthly Art Group Gathering.  They are located  at [facility], [address] [place] [address].  Call Leigh Ann Reaves at [phone] or email her at [email or web address] for the dates of future gatherings.**      **If you have having memory problems, consider HOBSCOTCH (Home-Based Self-management and Cognitive Training Changes lives).  It is an 8 week self-management program for adults with epilepsy and memory problems.  The program is free at the [facility].  Contact Charlene Bajwa at [phone] or [email or web address].**         In general, we recommend using good judgement when you are doing certain activities and to avoid those activities that if you were to have a seizure, you could harm yourself or others. In the [facility], it is the law that you cannot drive within 90 days of a seizure. We also recommend not standing over open flames, not getting on high ladders or the roof, not swimming or taking baths by yourself (showers are ok) and not operating heavy machinery or power tools.

## 2023-05-11 NOTE — LETTER
May 11, 2023     Iván Butler    Patient: Iván Butler   YOB: 2000   Date of Visit: 5/11/2023       Dear Dr. Butler:    Thank you for referring Iván Butler to me for evaluation. Below are the relevant portions of my assessment and plan of care.    If you have questions, please do not hesitate to call me. I look forward to following Iván along with you.         Sincerely,        Zaira Hoyt MD        CC: No Recipients    Zaira Hoyt MD  05/11/23 1412  Signed  Chief Complaint  Seizures    Subjective           Iván Butler presents to [facility] NEUROLOGY for   HISTORY OF PRESENT ILLNESS:    Iván Butler is a 22 year old right handed man who presents with his girl friend, Deepti, to neurology clinic for follow up evaluation and treatment of new onset seizures.  He tells me his symptoms start in the morning with an intense anxiety and dejavu sensation and he feels like he has some weird nauseated sensation in his stomach which may have started since he was in 3rd grade with morning vomiting and anxiety.  Last Tuesday and 3 weeks before that on August 9th his girl friend noticed that he will be having staring spells after which he will be confused and can be aggressive.  He had a brain MRI scan done on 9/13/2022 which looks normal.  He had a 3 hour prolonged EEG which was normal.  He was started on levetiracetam 500 mg BID which he is now taking with some agitation and feeling on edge and some fatigue.  He still has some trouble with getting words out and forming sentences since his seizure.  He tells me he has this underlying anxiety and he has had light sensitivity.  There is no family history of seizures in his immediate family but Shaan, his mother, has a cousin who was diagnosed with epilepsy as a teenager.  Last Tuesday supposedly patient's girl friend was woken up by patient convulsing or shaking.  He tells me he reportedly lost bladder with this spell.  He  "does not have any history of childhood febrile seizures.  No history of meningitis or encephalitis.  No history of concussion or brain injury.  He smokes marijuana occasionally.  He drinks 2-3 beers per week.  No increased stress. No lack of sleep.  He tells me both times he had a seizure he was vomiting.  He tells me since his last visit he had another seizure on 5/7/2023 as he woke up feeling anxious and felt like his throat was closing up and felt like he was getting numb all over and he took an additional levetiracetam and 3 minutes later he had rhythmic facial movements lasting about a minute and he opened up his eyes and felt confused and felt tired.      Past Medical History:   Diagnosis Date   • Gastroparesis         Family History   Problem Relation Age of Onset   • Dementia Maternal Grandfather         Social History     Socioeconomic History   • Marital status: Single   Tobacco Use   • Smoking status: Never   • Smokeless tobacco: Never   Substance and Sexual Activity   • Alcohol use: Yes     Comment: social        I have reviewed and confirmed the accuracy of the ROS as documented by the MA/LPN/RN Zaira Hoyt MD    Review of Systems   Neurological: Positive for seizures. Negative for dizziness, tremors, syncope, facial asymmetry, speech difficulty, weakness, light-headedness, numbness, headache, memory problem and confusion.   Psychiatric/Behavioral: Positive for agitation. Negative for behavioral problems, decreased concentration, dysphoric mood, hallucinations, self-injury, sleep disturbance, suicidal ideas, negative for hyperactivity, depressed mood and stress. The patient is not nervous/anxious.         Objective    Vital Signs:   /74   Pulse 76   Ht 185.4 cm (72.99\")   Wt 83 kg (183 lb)   SpO2 98%   BMI 24.15 kg/m²       PHYSICAL EXAM:    General   Mental Status - Alert. General Appearance - Well developed, Well groomed, Oriented and Cooperative. Orientation - Oriented X3.       Head " and Neck  Head - normocephalic, atraumatic with no lesions or palpable masses.  Neck    Global Assessment - supple.       Eye   Sclera/Conjunctiva - Bilateral - Normal.    ENMT  Mouth and Throat   Oral Cavity/Oropharynx: Oropharynx - the soft palate,uvula and tongue are normal in appearance.    Chest and Lung Exam   Chest - lung clear to auscultation bilaterally.    Cardiovascular   Cardiovascular examination reveals  - normal heart sounds, regular rate and rhythm.    Neurologic   Mental Status: Speech - Normal. Cognitive function - appropriate fund of knowledge. No impairment of attention, Impairment of concentration, impairment of long term memory or impairment of short term memory.  Cranial Nerves:   II Optic: Visual acuity - Left - Normal. Right - Normal. Visual fields - Normal (to confrontation).  III Oculomotor: Pupillary constriction - Left - Normal. Right - Normal.  VII Facial: - Normal Bilaterally.   IX Glossopharyngeal / X Vagus - Normal.  XI Accessory: Trapezius - Bilateral - Normal. Sternocleidomastoid - Bilateral - Normal.  XII Hypoglossal - Bilateral - Normal.  Eye Movements: - Normal Bilaterally.  Sensory:   Light Touch: Intact - Globally.  Motor:   Bulk and Contour: - Normal.  Tone: - Normal.  Tremor: Not present.  Strength: 5/5 normal muscle strength - All Muscles.   General Assessment of Reflexes: - deep tendon reflexes are normal. Coordination - No Impairment of finger-to-nose or Impairment of rapid alternating movements. Gait - Normal.      Result Review :                Assessment and Plan    Problem List Items Addressed This Visit        Neuro    New onset seizure - Primary    Current Assessment & Plan     22 year old right handed man with new onset seizures.  He tells me his symptoms start in the morning with an intense anxiety and dejavu sensation and he feels like he has some weird nauseated sensation in his stomach which may have started since he was in 3rd grade with morning vomiting and  anxiety.  Last Tuesday and 3 weeks before that on August 9th his girl friend noticed that he will be having staring spells after which he will be confused and can be aggressive.  He had a brain MRI scan done on 9/13/2022 which looks normal.  He had a 3 hour prolonged EEG which was normal.  He was started on levetiracetam 500 mg BID which he is now taking with some agitation and feeling on edge and some fatigue.  He still has some trouble with getting words out and forming sentences since his seizure.  He tells me he has this underlying anxiety and he has had light sensitivity.  There is no family history of seizures in his immediate family but Shaan, his mother, has a cousin who was diagnosed with epilepsy as a teenager.  Last Tuesday supposedly patient's girl friend was woken up by patient convulsing or shaking.  He tells me he reportedly lost bladder with this spell.  He does not have any history of childhood febrile seizures.  No history of meningitis or encephalitis.  No history of concussion or brain injury.  He smokes marijuana occasionally.  He drinks 2-3 beers per week.  No increased stress. No lack of sleep.  He tells me both times he had a seizure he was vomiting.  He tells me since his last visit he had another seizure on 5/7/2023 as he woke up feeling anxious and felt like his throat was closing up and felt like he was getting numb all over and he took an additional levetiracetam and 3 minutes later he had rhythmic facial movements lasting about a minute and he opened up his eyes and felt confused and felt tired.  I am going to increase his dose of levetiracetam today to 750 mg BID and try the XR formulation but if too expensive can switch the the regular formulation and set him up for prolonged continuous video EEG monitoring at an EMU where his Keppra can be weaned off to get a good baseline read off of ASDs.  Discussed seizure precautions including taking showers as opposed to baths, staying off of  elevated places and heights, no driving for 3 months of seizure freedom and no operating heavy machinery.           Relevant Medications    levETIRAcetam (KEPPRA XR) 750 MG tablet sustained-release 24 hour tablet    Other Relevant Orders    EEG Continuous Monitoring With Video       I spent 35 minutes caring for Iván on this date of service. This time includes time spent by me in the following activities:preparing for the visit, reviewing tests, obtaining and/or reviewing a separately obtained history, performing a medically appropriate examination and/or evaluation , counseling and educating the patient/family/caregiver, ordering medications, tests, or procedures, documenting information in the medical record and care coordination    Follow Up   No follow-ups on file.  Patient was given instructions and counseling regarding his condition or for health maintenance advice. Please see specific information pulled into the AVS if appropriate.

## 2023-05-11 NOTE — ASSESSMENT & PLAN NOTE
22 year old right handed man with new onset seizures.  He tells me his symptoms start in the morning with an intense anxiety and dejavu sensation and he feels like he has some weird nauseated sensation in his stomach which may have started since he was in 3rd grade with morning vomiting and anxiety.  Last Tuesday and 3 weeks before that on August 9th his girl friend noticed that he will be having staring spells after which he will be confused and can be aggressive.  He had a brain MRI scan done on 9/13/2022 which looks normal.  He had a 3 hour prolonged EEG which was normal.  He was started on levetiracetam 500 mg BID which he is now taking with some agitation and feeling on edge and some fatigue.  He still has some trouble with getting words out and forming sentences since his seizure.  He tells me he has this underlying anxiety and he has had light sensitivity.  There is no family history of seizures in his immediate family but Shaan, his mother, has a cousin who was diagnosed with epilepsy as a teenager.  Last Tuesday supposedly patient's girl friend was woken up by patient convulsing or shaking.  He tells me he reportedly lost bladder with this spell.  He does not have any history of childhood febrile seizures.  No history of meningitis or encephalitis.  No history of concussion or brain injury.  He smokes marijuana occasionally.  He drinks 2-3 beers per week.  No increased stress. No lack of sleep.  He tells me both times he had a seizure he was vomiting.  He tells me since his last visit he had another seizure on 5/7/2023 as he woke up feeling anxious and felt like his throat was closing up and felt like he was getting numb all over and he took an additional levetiracetam and 3 minutes later he had rhythmic facial movements lasting about a minute and he opened up his eyes and felt confused and felt tired.  I am going to increase his dose of levetiracetam today to 750 mg BID and try the XR formulation but if  too expensive can switch the the regular formulation and set him up for prolonged continuous video EEG monitoring at an EMU where his Keppra can be weaned off to get a good baseline read off of ASDs.  Discussed seizure precautions including taking showers as opposed to baths, staying off of elevated places and heights, no driving for 3 months of seizure freedom and no operating heavy machinery.

## 2023-09-15 DIAGNOSIS — R56.9 NEW ONSET SEIZURE: ICD-10-CM

## 2023-09-15 RX ORDER — LEVETIRACETAM 750 MG/1
TABLET, EXTENDED RELEASE ORAL
Qty: 60 TABLET | Refills: 0 | Status: SHIPPED | OUTPATIENT
Start: 2023-09-15

## 2023-10-17 DIAGNOSIS — R56.9 NEW ONSET SEIZURE: ICD-10-CM

## 2023-10-17 RX ORDER — LEVETIRACETAM 750 MG/1
TABLET, EXTENDED RELEASE ORAL
Qty: 60 TABLET | Refills: 0 | Status: SHIPPED | OUTPATIENT
Start: 2023-10-17

## 2023-11-24 DIAGNOSIS — R56.9 NEW ONSET SEIZURE: ICD-10-CM

## 2023-11-27 RX ORDER — LEVETIRACETAM 750 MG/1
TABLET, FILM COATED, EXTENDED RELEASE ORAL
Qty: 60 TABLET | Refills: 2 | Status: SHIPPED | OUTPATIENT
Start: 2023-11-27

## 2024-02-28 DIAGNOSIS — R56.9 NEW ONSET SEIZURE: ICD-10-CM

## 2024-02-28 RX ORDER — LEVETIRACETAM 750 MG/1
1500 TABLET, FILM COATED, EXTENDED RELEASE ORAL DAILY
Qty: 60 TABLET | Refills: 2 | Status: SHIPPED | OUTPATIENT
Start: 2024-02-28

## 2024-06-06 DIAGNOSIS — R56.9 NEW ONSET SEIZURE: ICD-10-CM

## 2024-06-06 RX ORDER — LEVETIRACETAM 750 MG/1
1500 TABLET, FILM COATED, EXTENDED RELEASE ORAL DAILY
Qty: 60 TABLET | Refills: 0 | OUTPATIENT
Start: 2024-06-06

## 2024-06-13 ENCOUNTER — TELEPHONE (OUTPATIENT)
Dept: NEUROLOGY | Facility: CLINIC | Age: 24
End: 2024-06-13
Payer: COMMERCIAL

## 2024-06-13 NOTE — TELEPHONE ENCOUNTER
Spoke with in regards to setting up a follow appt due to needing medication refills needing to be renewed. Patient stated he could only get off on June 19th, also had stated if they can do a video visit for follow up as they have no availability except for June 19th.

## 2024-06-14 DIAGNOSIS — R56.9 NEW ONSET SEIZURE: ICD-10-CM

## 2024-06-14 RX ORDER — LEVETIRACETAM 750 MG/1
1500 TABLET, FILM COATED, EXTENDED RELEASE ORAL DAILY
Qty: 60 TABLET | Refills: 2 | Status: SHIPPED | OUTPATIENT
Start: 2024-06-14

## 2024-06-14 RX ORDER — LEVETIRACETAM 750 MG/1
1500 TABLET, FILM COATED, EXTENDED RELEASE ORAL DAILY
Qty: 60 TABLET | Refills: 0 | OUTPATIENT
Start: 2024-06-14

## 2024-06-19 ENCOUNTER — OFFICE VISIT (OUTPATIENT)
Dept: NEUROLOGY | Facility: CLINIC | Age: 24
End: 2024-06-19
Payer: COMMERCIAL

## 2024-06-19 VITALS
DIASTOLIC BLOOD PRESSURE: 80 MMHG | SYSTOLIC BLOOD PRESSURE: 130 MMHG | BODY MASS INDEX: 24.12 KG/M2 | WEIGHT: 182 LBS | HEIGHT: 73 IN | HEART RATE: 53 BPM

## 2024-06-19 DIAGNOSIS — R56.9 SEIZURE: Primary | ICD-10-CM

## 2024-06-19 PROCEDURE — 99214 OFFICE O/P EST MOD 30 MIN: CPT | Performed by: PSYCHIATRY & NEUROLOGY

## 2024-06-19 RX ORDER — BARICITINIB 2 MG/1
TABLET, FILM COATED ORAL DAILY
COMMUNITY
Start: 2024-05-30

## 2024-06-19 RX ORDER — LEVETIRACETAM 750 MG/1
1500 TABLET, FILM COATED, EXTENDED RELEASE ORAL DAILY
Qty: 180 TABLET | Refills: 1 | Status: SHIPPED | OUTPATIENT
Start: 2024-06-19

## 2024-06-19 NOTE — ASSESSMENT & PLAN NOTE
24 year old right handed man who returns with his girl friend, Deepti, to neurology clinic for follow up evaluation and treatment of new onset seizures.  He tells me his symptoms start in the morning with an intense anxiety and dejavu sensation and he feels like he has some weird nauseated sensation in his stomach which may have started since he was in 3rd grade with morning vomiting and anxiety.  The tuesday and 3 weeks before that on August 9th his girl friend noticed that he will be having staring spells after which he will be confused and can be aggressive.  He had a brain MRI scan done on 9/13/2022 which looks normal.  He had a 3 hour prolonged EEG which was normal.  He was started on levetiracetam 500 mg BID which caused agitation and feeling on edge and some fatigue but this has improved with the XR formulation and we have increased his dose since last visit to 1500 mg.  He still has some trouble with getting words out and forming sentences since his seizure.  He tells me he has this underlying anxiety and he has had light sensitivity.  There is no family history of seizures in his immediate family but Shaan, his mother, has a cousin who was diagnosed with epilepsy as a teenager.  On the Tuesday supposedly patient's girl friend was woken up by patient convulsing or shaking.  He tells me he reportedly lost bladder with this spell.  He does not have any history of childhood febrile seizures.  No history of meningitis or encephalitis.  No history of concussion or brain injury.  He smokes marijuana occasionally.  He drinks 2-3 beers per week.  No increased stress. No lack of sleep.  He tells me both times he had a seizure he was vomiting.  He had another seizure on 5/7/2023 as he woke up feeling anxious and felt like his throat was closing up and felt like he was getting numb all over and he took an additional levetiracetam and 3 minutes later he had rhythmic facial movements lasting about a minute and he opened up  his eyes and felt confused and felt tired.  He has not had any new seizures since his last visit but may have had a strong aura in 10/2023 when he had COVID 19 but has been doing well since that time and he likes the XR formulation better than the immediate release.  At this time we will continue current dose of levetiracetam XR.  Discussed seizure precautions again and advised him to take his medication as prescribed without missing any doses as epileptic seizures can be potentially life threatening and fatal.  Discussed SUDEP risk.

## 2024-06-19 NOTE — PROGRESS NOTES
Chief Complaint  Seizures (No new sz reports had a strong aura in October denies missed meds . )    Subjective          Iván Butler presents to [facility]UROLOG[facility] for   HISTORY OF PRESENT ILLNESS:    Iván Butler is a 24 year old right handed man who returns with his girl friend, Deepti, to neurology clinic for follow up evaluation and treatment of new onset seizures.  He tells me his symptoms start in the morning with an intense anxiety and dejavu sensation and he feels like he has some weird nauseated sensation in his stomach which may have started since he was in 3rd grade with morning vomiting and anxiety.  The tuesday and 3 weeks before that on August 9th his girl friend noticed that he will be having staring spells after which he will be confused and can be aggressive.  He had a brain MRI scan done on 9/13/2022 which looks normal.  He had a 3 hour prolonged EEG which was normal.  He was started on levetiracetam 500 mg BID which caused agitation and feeling on edge and some fatigue but this has improved with the XR formulation and we have increased his dose since last visit to 1500 mg.  He still has some trouble with getting words out and forming sentences since his seizure.  He tells me he has this underlying anxiety and he has had light sensitivity.  There is no family history of seizures in his immediate family but Shaan, his mother, has a cousin who was diagnosed with epilepsy as a teenager.  On the Tuesday supposedly patient's girl friend was woken up by patient convulsing or shaking.  He tells me he reportedly lost bladder with this spell.  He does not have any history of childhood febrile seizures.  No history of meningitis or encephalitis.  No history of concussion or brain injury.  He smokes marijuana occasionally.  He drinks 2-3 beers per week.  No increased stress. No lack of sleep.  He tells me both times he had a seizure he was vomiting.  He had another seizure on 5/7/2023 as  "he woke up feeling anxious and felt like his throat was closing up and felt like he was getting numb all over and he took an additional levetiracetam and 3 minutes later he had rhythmic facial movements lasting about a minute and he opened up his eyes and felt confused and felt tired.  He has not had any new seizures since his last visit but may have had a strong aura in 10/2023 when he had COVID 19 but has been doing well since that time and he likes the XR formulation better than the immediate release.      Past Medical History:   Diagnosis Date    Gastroparesis         Family History   Problem Relation Age of Onset    Cancer Father     Dementia Maternal Grandfather         Social History     Socioeconomic History    Marital status: Single   Tobacco Use    Smoking status: Never    Smokeless tobacco: Never   Substance and Sexual Activity    Alcohol use: Yes     Comment: social        I have reviewed and confirmed the accuracy of the ROS as documented by the MA/LPN/RN Zaira Hoyt MD   Review of Systems   Neurological:  Positive for dizziness, tremors, weakness, light-headedness, headache and confusion. Negative for seizures, syncope, facial asymmetry, speech difficulty, numbness and memory problem.   Psychiatric/Behavioral:  Positive for behavioral problems (lack of patience), sleep disturbance and stress. Negative for agitation, decreased concentration, dysphoric mood, hallucinations, self-injury, suicidal ideas, negative for hyperactivity and depressed mood. The patient is nervous/anxious.         Objective   Vital Signs:   /80   Pulse 53   Ht 185.4 cm (72.99\")   Wt 82.6 kg (182 lb)   BMI 24.02 kg/m²       PHYSICAL EXAM:    General   Mental Status - Alert. General Appearance - Well developed, Well groomed, Oriented and Cooperative. Orientation - Oriented X3.       Head and Neck  Head - normocephalic, atraumatic with no lesions or palpable masses.  Neck    Global Assessment - supple.       Eye "   Sclera/Conjunctiva - Bilateral - Normal.    ENMT  Mouth and Throat   Oral Cavity/Oropharynx: Oropharynx - the soft palate,uvula and tongue are normal in appearance.    Chest and Lung Exam   Chest - lung clear to auscultation bilaterally.    Cardiovascular   Cardiovascular examination reveals  - normal heart sounds, regular rate and rhythm.    Neurologic   Mental Status: Speech - Normal. Cognitive function - appropriate fund of knowledge. No impairment of attention, Impairment of concentration, impairment of long term memory or impairment of short term memory.  Cranial Nerves:   II Optic: Visual acuity - Left - Normal. Right - Normal. Visual fields - Normal (to confrontation).  III Oculomotor: Pupillary constriction - Left - Normal. Right - Normal.  VII Facial: - Normal Bilaterally.   IX Glossopharyngeal / X Vagus - Normal.  XI Accessory: Trapezius - Bilateral - Normal. Sternocleidomastoid - Bilateral - Normal.  XII Hypoglossal - Bilateral - Normal.  Eye Movements: - Normal Bilaterally.  Sensory:   Light Touch: Intact - Globally.  Motor:   Bulk and Contour: - Normal.  Tone: - Normal.  Tremor: Not present.  Strength: 5/5 normal muscle strength - All Muscles.   General Assessment of Reflexes: - deep tendon reflexes are normal. Coordination - No Impairment of finger-to-nose or Impairment of rapid alternating movements. Gait - Normal.       Result Review :                 Assessment and Plan    Problem List Items Addressed This Visit          Neuro    Seizure - Primary    Current Assessment & Plan     24 year old right handed man who returns with his girl friend, Deepti, to neurology clinic for follow up evaluation and treatment of new onset seizures.  He tells me his symptoms start in the morning with an intense anxiety and dejavu sensation and he feels like he has some weird nauseated sensation in his stomach which may have started since he was in 3rd grade with morning vomiting and anxiety.  The tuesday and 3 weeks  before that on August 9th his girl friend noticed that he will be having staring spells after which he will be confused and can be aggressive.  He had a brain MRI scan done on 9/13/2022 which looks normal.  He had a 3 hour prolonged EEG which was normal.  He was started on levetiracetam 500 mg BID which caused agitation and feeling on edge and some fatigue but this has improved with the XR formulation and we have increased his dose since last visit to 1500 mg BID.  He still has some trouble with getting words out and forming sentences since his seizure.  He tells me he has this underlying anxiety and he has had light sensitivity.  There is no family history of seizures in his immediate family but Shaan, his mother, has a cousin who was diagnosed with epilepsy as a teenager.  On the Tuesday supposedly patient's girl friend was woken up by patient convulsing or shaking.  He tells me he reportedly lost bladder with this spell.  He does not have any history of childhood febrile seizures.  No history of meningitis or encephalitis.  No history of concussion or brain injury.  He smokes marijuana occasionally.  He drinks 2-3 beers per week.  No increased stress. No lack of sleep.  He tells me both times he had a seizure he was vomiting.  He had another seizure on 5/7/2023 as he woke up feeling anxious and felt like his throat was closing up and felt like he was getting numb all over and he took an additional levetiracetam and 3 minutes later he had rhythmic facial movements lasting about a minute and he opened up his eyes and felt confused and felt tired.  He has not had any new seizures since his last visit but may have had a strong aura in 10/2023 when he had COVID 19 but has been doing well since that time and he likes the XR formulation better than the immediate release.  At this time we will continue current dose of levetiracetam XR.  Discussed seizure precautions again and advised him to take his medication as  prescribed without missing any doses as epileptic seizures can be potentially life threatening and fatal.  Discussed SUDEP risk.           Relevant Medications    levETIRAcetam (KEPPRA XR) 750 MG tablet sustained-release 24 hour tablet       Follow Up   Return in about 6 months (around 12/19/2024).  Patient was given instructions and counseling regarding his condition or for health maintenance advice. Please see specific information pulled into the AVS if appropriate.

## 2024-06-19 NOTE — PATIENT INSTRUCTIONS
In general, we recommend using good judgement when you are doing certain activities and to avoid those activities that if you were to have a seizure, you could harm yourself or others. In the state of [place], it is the law that you cannot drive within 90 days of a seizure. We also recommend not standing over open flames, not getting on high ladders or the roof, not swimming or taking baths by yourself (showers are ok) and not operating heavy machinery or power tools.  [facility]  Zaira Hoyt MD  Neurology clinic  [phone]    With anti-seizure medications, you may initially notice side effects of fatigue, drowsiness, unsteadiness, and dizziness.  Other possible side effects include nausea, abdominal pain, headache, blurry or double vision, slurred speech and mood changes.  Generally, patients will noticed these symptoms when the medication is first started or with higher doses and will go away with time.    It is import to consistently take your medication every day.  Missing just one dose may put you at risk for a breakthrough seizure.  Consider using reminders on your phone or a pill box.    If you develop a rash, please call the neurology clinic immediately or notify another healthcare professional, as this may be potentially life-threatening.  If you are unable to reach a healthcare professional, go to the emergency room immediately for further evaluation.    If you develop thoughts of wanting to hurt yourself or others, please call the neurology clinic immediately to notify another healthcare professional.  If you are unable to reach a healthcare professional, go to the emergency room immediately for further evaluation.    It is the [place] state law that you cannot drive within 90 days of a seizure.    You should avoid certain activities that if you were to have a seizure, you could harm yourself or others. In general, it is recommended that you avoid operating heavy machinery or  power tools, swimming or taking baths by yourself (showers are ok), don't stand over open flames, don't get on high ladders or the roof.  I also recommend to avoid sleeping on your stomach.    For further information on epilepsy and resources available to patients and their families, please visit the [facility] at [email or web address] or call [phone].    **Check out the [facility]'s monthly Art Group Gathering.  [facility] [address].  Call Leigh Ann Reaves at [phone] or email her at [email or web address] for the dates of future gatherings.**      **If you have having memory problems, consider HOBSCOTCH (Home-Based Self-management and Cognitive Training Changes lives).  It is an 8 week self-management program for adults with epilepsy and memory problems.  The program is free at the Epile[facility].  Contact Charlene Bajwa at [phone] or [email or web address].**

## 2024-12-27 ENCOUNTER — OFFICE VISIT (OUTPATIENT)
Dept: NEUROLOGY | Facility: CLINIC | Age: 24
End: 2024-12-27
Payer: COMMERCIAL

## 2024-12-27 VITALS
BODY MASS INDEX: 24.78 KG/M2 | DIASTOLIC BLOOD PRESSURE: 64 MMHG | HEIGHT: 73 IN | OXYGEN SATURATION: 98 % | SYSTOLIC BLOOD PRESSURE: 116 MMHG | WEIGHT: 187 LBS | HEART RATE: 58 BPM

## 2024-12-27 DIAGNOSIS — R56.9 SEIZURE: Primary | ICD-10-CM

## 2024-12-27 PROCEDURE — 99214 OFFICE O/P EST MOD 30 MIN: CPT | Performed by: PSYCHIATRY & NEUROLOGY

## 2024-12-27 RX ORDER — CALCIUM CARBONATE 300MG(750)
TABLET,CHEWABLE ORAL
COMMUNITY

## 2024-12-27 RX ORDER — LEVETIRACETAM 500 MG/1
2000 TABLET, FILM COATED, EXTENDED RELEASE ORAL DAILY
Qty: 360 TABLET | Refills: 0 | Status: SHIPPED | OUTPATIENT
Start: 2024-12-27

## 2024-12-27 NOTE — ASSESSMENT & PLAN NOTE
24 year old right handed man with seizures.  He tells me his symptoms start in the morning with an intense anxiety and dejavu sensation and he feels like he has some weird nauseated sensation in his stomach which may have started since he was in 3rd grade with morning vomiting and anxiety.  The tuesday and 3 weeks before that on August 9th his girl friend noticed that he will be having staring spells after which he will be confused and can be aggressive.  He had a brain MRI scan done on 9/13/2022 which looks normal.  He had a 3 hour prolonged EEG which was normal.  He was started on levetiracetam 500 mg BID which caused agitation and feeling on edge and some fatigue but this has improved with the XR formulation and we have increased his dose since last visit to 1500 mg.  He still has some trouble with getting words out and forming sentences since his seizure.  He tells me he has this underlying anxiety and he has had light sensitivity.  There is no family history of seizures in his immediate family but Shaan, his mother, has a cousin who was diagnosed with epilepsy as a teenager.  On the Tuesday supposedly patient's girl friend was woken up by patient convulsing or shaking.  He tells me he reportedly lost bladder with this spell.  He does not have any history of childhood febrile seizures.  No history of meningitis or encephalitis.  No history of concussion or brain injury.  He smokes marijuana occasionally.  He drinks 2-3 beers per week.  No increased stress. No lack of sleep.  He tells me both times he had a seizure he was vomiting.  He had another seizure on 5/7/2023 as he woke up feeling anxious and felt like his throat was closing up and felt like he was getting numb all over and he took an additional levetiracetam and 3 minutes later he had rhythmic facial movements lasting about a minute and he opened up his eyes and felt confused and felt tired.  He has not had any new seizures since his last visit but may  have had a strong auras involving dejavu sensations.  He does report having some muscle spasms/tremors occasionally which he does not think is related.  I spoke with him with regards to my concern with the dejavu auras continuing and will increase his dose of levetiracetam XR to 1000 mg BID for further assistance and will check trough levetiracetam level.  He also reports taking magnesium which appears to help as well.  I advised him to take the levetiracetam as prescribed without missing any doses.  Discussed seizure precautions and the fact that epileptic seizures can be potentially fatal/life threatening. Discussed risk for SUDEP.

## 2024-12-27 NOTE — PATIENT INSTRUCTIONS
[facility]  Zaira Hoyt MD  Neurology clinic  [phone]    With anti-seizure medications, you may initially notice side effects of fatigue, drowsiness, unsteadiness, and dizziness.  Other possible side effects include nausea, abdominal pain, headache, blurry or double vision, slurred speech and mood changes.  Generally, patients will noticed these symptoms when the medication is first started or with higher doses and will go away with time.    It is import to consistently take your medication every day.  Missing just one dose may put you at risk for a breakthrough seizure.  Consider using reminders on your phone or a pill box.    If you develop a rash, please call the neurology clinic immediately or notify another healthcare professional, as this may be potentially life-threatening.  If you are unable to reach a healthcare professional, go to the emergency room immediately for further evaluation.    If you develop thoughts of wanting to hurt yourself or others, please call the neurology clinic immediately to notify another healthcare professional.  If you are unable to reach a healthcare professional, go to the emergency room immediately for further evaluation.    It is the [place] state law that you cannot drive within 90 days of a seizure.    You should avoid certain activities that if you were to have a seizure, you could harm yourself or others. In general, it is recommended that you avoid operating heavy machinery or power tools, swimming or taking baths by yourself (showers are ok), don't stand over open flames, don't get on high ladders or the roof.  I also recommend to avoid sleeping on your stomach.    For further information on epilepsy and resources available to patients and their families, please visit the [facility] at [email or web address] or call [phone].    **Check out the [facility]'s monthly Art Group Gathering.  They are located  at [facility], [address] [place] [address].  Call Leigh Ann Reaves at [phone] or email her at [email or web address] for the dates of future gatherings.**      **If you have having memory problems, consider HOBSCOTCH (Home-Based Self-management and Cognitive Training Changes lives).  It is an 8 week self-management program for adults with epilepsy and memory problems.  The program is free at the [facility].  Contact Charlene Bajwa at [phone] or [email or web address].**         In general, we recommend using good judgement when you are doing certain activities and to avoid those activities that if you were to have a seizure, you could harm yourself or others. In the [facility], it is the law that you cannot drive within 90 days of a seizure. We also recommend not standing over open flames, not getting on high ladders or the roof, not swimming or taking baths by yourself (showers are ok) and not operating heavy machinery or power tools.

## 2024-12-27 NOTE — PROGRESS NOTES
Chief Complaint  Seizures (Accompanied by Girlfriend (Deepti) - No new sz)    Subjective          Iván Butler presents to [facility] NEUROLOGY for   HISTORY OF PRESENT ILLNESS:    Iván Butler is a 24 year old right handed man who returns with his girl friend, Deepti, to neurology clinic for follow up evaluation and treatment of seizures.  He tells me his symptoms start in the morning with an intense anxiety and dejavu sensation and he feels like he has some weird nauseated sensation in his stomach which may have started since he was in 3rd grade with morning vomiting and anxiety.  The tuesday and 3 weeks before that on August 9th his girl friend noticed that he will be having staring spells after which he will be confused and can be aggressive.  He had a brain MRI scan done on 9/13/2022 which looks normal.  He had a 3 hour prolonged EEG which was normal.  He was started on levetiracetam 500 mg BID which caused agitation and feeling on edge and some fatigue but this has improved with the XR formulation and we have increased his dose since last visit to 1500 mg.  He still has some trouble with getting words out and forming sentences since his seizure.  He tells me he has this underlying anxiety and he has had light sensitivity.  There is no family history of seizures in his immediate family but Shaan, his mother, has a cousin who was diagnosed with epilepsy as a teenager.  On the Tuesday supposedly patient's girl friend was woken up by patient convulsing or shaking.  He tells me he reportedly lost bladder with this spell.  He does not have any history of childhood febrile seizures.  No history of meningitis or encephalitis.  No history of concussion or brain injury.  He smokes marijuana occasionally.  He drinks 2-3 beers per week.  No increased stress. No lack of sleep.  He tells me both times he had a seizure he was vomiting.  He had another seizure on 5/7/2023 as he woke up feeling anxious and  "felt like his throat was closing up and felt like he was getting numb all over and he took an additional levetiracetam and 3 minutes later he had rhythmic facial movements lasting about a minute and he opened up his eyes and felt confused and felt tired.  He has not had any new seizures since his last visit but may have had a strong auras involving dejavu sensations.  He does report having some muscle spasms/tremors occasionally which he does not think is related.  He is tolerating the medicine well.     Past Medical History:   Diagnosis Date    Gastroparesis         Family History   Problem Relation Age of Onset    Cancer Father     Dementia Maternal Grandfather         Social History     Socioeconomic History    Marital status: Single   Tobacco Use    Smoking status: Never    Smokeless tobacco: Never   Substance and Sexual Activity    Alcohol use: Yes     Comment: social        I have reviewed and confirmed the accuracy of the ROS as documented by the MA/LPN/RN Zaira Hoyt MD   Review of Systems   Neurological:  Positive for tremors (Back and neck) and seizures. Negative for dizziness, syncope, facial asymmetry, speech difficulty, weakness, light-headedness, numbness, headache, memory problem and confusion.   Psychiatric/Behavioral:  Negative for agitation, behavioral problems, decreased concentration, dysphoric mood, hallucinations, self-injury, sleep disturbance, suicidal ideas, negative for hyperactivity, depressed mood and stress. The patient is not nervous/anxious.         Objective   Vital Signs:   /64   Pulse 58   Ht 185.4 cm (72.99\")   Wt 84.8 kg (187 lb)   SpO2 98%   BMI 24.68 kg/m²       PHYSICAL EXAM:    General   Mental Status - Alert. General Appearance - Well developed, Well groomed, Oriented and Cooperative. Orientation - Oriented X3.       Head and Neck  Head - normocephalic, atraumatic with no lesions or palpable masses.  Neck    Global Assessment - supple.       Eye "   Sclera/Conjunctiva - Bilateral - Normal.    ENMT  Mouth and Throat   Oral Cavity/Oropharynx: Oropharynx - the soft palate,uvula and tongue are normal in appearance.    Chest and Lung Exam   Chest - lung clear to auscultation bilaterally.    Cardiovascular   Cardiovascular examination reveals  - normal heart sounds, regular rate and rhythm.    Neurologic   Mental Status: Speech - Normal. Cognitive function - appropriate fund of knowledge. No impairment of attention, Impairment of concentration, impairment of long term memory or impairment of short term memory.  Cranial Nerves:   II Optic: Visual acuity - Left - Normal. Right - Normal. Visual fields - Normal (to confrontation).  III Oculomotor: Pupillary constriction - Left - Normal. Right - Normal.  VII Facial: - Normal Bilaterally.   IX Glossopharyngeal / X Vagus - Normal.  XI Accessory: Trapezius - Bilateral - Normal. Sternocleidomastoid - Bilateral - Normal.  XII Hypoglossal - Bilateral - Normal.  Eye Movements: - Normal Bilaterally.  Sensory:   Light Touch: Intact - Globally.  Motor:   Bulk and Contour: - Normal.  Tone: - Normal.  Tremor: Not present.  Strength: 5/5 normal muscle strength - All Muscles.   General Assessment of Reflexes: - deep tendon reflexes are normal. Coordination - No Impairment of finger-to-nose or Impairment of rapid alternating movements. Gait - Normal.       Result Review :                 Assessment and Plan    Problem List Items Addressed This Visit       Seizure - Primary    Current Assessment & Plan     24 year old right handed man with seizures.  He tells me his symptoms start in the morning with an intense anxiety and dejavu sensation and he feels like he has some weird nauseated sensation in his stomach which may have started since he was in 3rd grade with morning vomiting and anxiety.  The tuesday and 3 weeks before that on August 9th his girl friend noticed that he will be having staring spells after which he will be confused  and can be aggressive.  He had a brain MRI scan done on 9/13/2022 which looks normal.  He had a 3 hour prolonged EEG which was normal.  He was started on levetiracetam 500 mg BID which caused agitation and feeling on edge and some fatigue but this has improved with the XR formulation and we have increased his dose since last visit to 1500 mg.  He still has some trouble with getting words out and forming sentences since his seizure.  He tells me he has this underlying anxiety and he has had light sensitivity.  There is no family history of seizures in his immediate family but Shaan, his mother, has a cousin who was diagnosed with epilepsy as a teenager.  On the Tuesday supposedly patient's girl friend was woken up by patient convulsing or shaking.  He tells me he reportedly lost bladder with this spell.  He does not have any history of childhood febrile seizures.  No history of meningitis or encephalitis.  No history of concussion or brain injury.  He smokes marijuana occasionally.  He drinks 2-3 beers per week.  No increased stress. No lack of sleep.  He tells me both times he had a seizure he was vomiting.  He had another seizure on 5/7/2023 as he woke up feeling anxious and felt like his throat was closing up and felt like he was getting numb all over and he took an additional levetiracetam and 3 minutes later he had rhythmic facial movements lasting about a minute and he opened up his eyes and felt confused and felt tired.  He has not had any new seizures since his last visit but may have had a strong auras involving dejavu sensations.  He does report having some muscle spasms/tremors occasionally which he does not think is related.  I spoke with him with regards to my concern with the dejavu auras continuing and will increase his dose of levetiracetam XR to 1000 mg BID for further assistance and will check trough levetiracetam level.  He also reports taking magnesium which appears to help as well.  I advised him  to take the levetiracetam as prescribed without missing any doses.  Discussed seizure precautions and the fact that epileptic seizures can be potentially fatal/life threatening. Discussed risk for SUDEP.           Relevant Medications    levETIRAcetam XR (KEPPRA XR) 500 MG tablet    Other Relevant Orders    Levetiracetam Level (Keppra)       Follow Up   No follow-ups on file.  Patient was given instructions and counseling regarding his condition or for health maintenance advice. Please see specific information pulled into the AVS if appropriate.

## 2025-03-10 DIAGNOSIS — R56.9 SEIZURE: ICD-10-CM

## 2025-03-10 RX ORDER — LEVETIRACETAM 500 MG/1
2000 TABLET, FILM COATED, EXTENDED RELEASE ORAL DAILY
Qty: 360 TABLET | Refills: 0 | Status: SHIPPED | OUTPATIENT
Start: 2025-03-10

## 2025-03-28 ENCOUNTER — OFFICE VISIT (OUTPATIENT)
Dept: NEUROLOGY | Facility: CLINIC | Age: 25
End: 2025-03-28
Payer: COMMERCIAL

## 2025-03-28 VITALS
HEIGHT: 73 IN | OXYGEN SATURATION: 99 % | HEART RATE: 54 BPM | DIASTOLIC BLOOD PRESSURE: 72 MMHG | BODY MASS INDEX: 24.46 KG/M2 | WEIGHT: 184.6 LBS | SYSTOLIC BLOOD PRESSURE: 124 MMHG

## 2025-03-28 DIAGNOSIS — R56.9 SEIZURE: Primary | ICD-10-CM

## 2025-03-28 PROCEDURE — 99214 OFFICE O/P EST MOD 30 MIN: CPT | Performed by: PSYCHIATRY & NEUROLOGY

## 2025-03-28 RX ORDER — LEVETIRACETAM 500 MG/1
2000 TABLET, FILM COATED, EXTENDED RELEASE ORAL DAILY
Qty: 360 TABLET | Refills: 0 | Status: SHIPPED | OUTPATIENT
Start: 2025-03-28 | End: 2025-03-28

## 2025-03-28 RX ORDER — LEVETIRACETAM 500 MG/1
2000 TABLET, FILM COATED, EXTENDED RELEASE ORAL DAILY
Qty: 360 TABLET | Refills: 3 | Status: SHIPPED | OUTPATIENT
Start: 2025-03-28

## 2025-03-28 NOTE — PATIENT INSTRUCTIONS
In general, we recommend using good judgement when you are doing certain activities and to avoid those activities that if you were to have a seizure, you could harm yourself or others. In the state of [place], it is the law that you cannot drive within 90 days of a seizure. We also recommend not standing over open flames, not getting on high ladders or the roof, not swimming or taking baths by yourself (showers are ok) and not operating heavy machinery or power tools.  [facility]  Zaira Hoyt MD  Neurology clinic  [phone]    With anti-seizure medications, you may initially notice side effects of fatigue, drowsiness, unsteadiness, and dizziness.  Other possible side effects include nausea, abdominal pain, headache, blurry or double vision, slurred speech and mood changes.  Generally, patients will noticed these symptoms when the medication is first started or with higher doses and will go away with time.    It is import to consistently take your medication every day.  Missing just one dose may put you at risk for a breakthrough seizure.  Consider using reminders on your phone or a pill box.    If you develop a rash, please call the neurology clinic immediately or notify another healthcare professional, as this may be potentially life-threatening.  If you are unable to reach a healthcare professional, go to the emergency room immediately for further evaluation.    If you develop thoughts of wanting to hurt yourself or others, please call the neurology clinic immediately to notify another healthcare professional.  If you are unable to reach a healthcare professional, go to the emergency room immediately for further evaluation.    It is the [place] state law that you cannot drive within 90 days of a seizure.    You should avoid certain activities that if you were to have a seizure, you could harm yourself or others. In general, it is recommended that you avoid operating heavy machinery or  power tools, swimming or taking baths by yourself (showers are ok), don't stand over open flames, don't get on high ladders or the roof.  I also recommend to avoid sleeping on your stomach.    For further information on epilepsy and resources available to patients and their families, please visit the [facility] at [email or web address] or call [phone].    **Check out the [facility]'s monthly Art Group Gathering.  [facility] [address].  Call Leigh Ann Reaves at [phone] or email her at [email or web address] for the dates of future gatherings.**      **If you have having memory problems, consider HOBSCOTCH (Home-Based Self-management and Cognitive Training Changes lives).  It is an 8 week self-management program for adults with epilepsy and memory problems.  The program is free at the Epile[facility].  Contact Charlene Bajwa at [phone] or [email or web address].**

## 2025-03-28 NOTE — PROGRESS NOTES
Chief Complaint  Seizures (Leviteractame no missed dosages- has had some episodes- not driving- needs- wants to discuss FMLA- d/t trouble at work- reports being tired and stressed. - )    Subjective          Iván Butler presents to [facility]UR[facility] for   HISTORY OF PRESENT ILLNESS:    Iván Butler is a 24 year old right handed man who returns with his girl friend, Deepti, to neurology clinic for follow up evaluation and treatment of possible seizures and more spells in February and March.  He tells me his symptoms start in the morning with an intense anxiety and dejavu sensation and he feels like he has some weird nauseated sensation in his stomach which may have started since he was in 3rd grade with morning vomiting and anxiety.  The tuesday and 3 weeks before that on August 9th his girl friend noticed that he will be having staring spells after which he will be confused and can be aggressive.  He had a brain MRI scan done on 9/13/2022 which looks normal.  He had a 3 hour prolonged EEG which was normal.  He was started on levetiracetam 500 mg BID which caused agitation and feeling on edge and some fatigue but this has improved with the XR formulation and we have increased his dose since last visit to 1500 mg.  He still has some trouble with getting words out and forming sentences since his seizure.  He tells me he has this underlying anxiety and he has had light sensitivity.  There is no family history of seizures in his immediate family but Shaan, his mother, has a cousin who was diagnosed with epilepsy as a teenager.  On the Tuesday supposedly patient's girl friend was woken up by patient convulsing or shaking.  He tells me he reportedly lost bladder with this spell.  He does not have any history of childhood febrile seizures.  No history of meningitis or encephalitis.  No history of concussion or brain injury.  He smokes marijuana occasionally.  He drinks 2-3 beers per week.  No  "increased stress. No lack of sleep.  He tells me both times he had a seizure he was vomiting.  He had another seizure on 5/7/2023 as he woke up feeling anxious and felt like his throat was closing up and felt like he was getting numb all over and he took an additional levetiracetam and 3 minutes later he had rhythmic facial movements lasting about a minute and he opened up his eyes and felt confused and felt tired.  He has not had any new seizures since his last visit but may have had a strong auras involving dejavu sensations.  He does report having some muscle spasms/tremors occasionally which he does not think is related and he continues to have more spells in February and March.  He is tolerating the medicine well.     Past Medical History:   Diagnosis Date    Gastroparesis     Seizures 9/12/2022        Family History   Problem Relation Age of Onset    Cancer Father     Dementia Maternal Grandfather         Social History     Socioeconomic History    Marital status: Single   Tobacco Use    Smoking status: Never    Smokeless tobacco: Never   Vaping Use    Vaping status: Never Used   Substance and Sexual Activity    Alcohol use: Yes     Comment: social        I have reviewed and confirmed the accuracy of the ROS as documented by the MA/LPN/RN Zaira Hoyt MD   Review of Systems   Neurological:  Positive for seizures. Negative for dizziness, tremors, syncope, facial asymmetry, speech difficulty, weakness, light-headedness, numbness, headache, memory problem and confusion.   Psychiatric/Behavioral:  Positive for behavioral problems, sleep disturbance and stress. Negative for agitation, decreased concentration, dysphoric mood, hallucinations, self-injury, suicidal ideas, negative for hyperactivity and depressed mood. The patient is not nervous/anxious.         Objective   Vital Signs:   /72   Pulse 54   Ht 185.4 cm (72.99\")   Wt 83.7 kg (184 lb 9.6 oz)   SpO2 99%   BMI 24.36 kg/m²       PHYSICAL " EXAM:    General   Mental Status - Alert. General Appearance - Well developed, Well groomed, Oriented and Cooperative. Orientation - Oriented X3.       Head and Neck  Head - normocephalic, atraumatic with no lesions or palpable masses.  Neck    Global Assessment - supple.       Eye   Sclera/Conjunctiva - Bilateral - Normal.    ENMT  Mouth and Throat   Oral Cavity/Oropharynx: Oropharynx - the soft palate,uvula and tongue are normal in appearance.    Chest and Lung Exam   Chest - lung clear to auscultation bilaterally.    Cardiovascular   Cardiovascular examination reveals  - normal heart sounds, regular rate and rhythm.    Neurologic   Mental Status: Speech - Normal. Cognitive function - appropriate fund of knowledge. No impairment of attention, Impairment of concentration, impairment of long term memory or impairment of short term memory.  Cranial Nerves:   II Optic: Visual acuity - Left - Normal. Right - Normal. Visual fields - Normal (to confrontation).  III Oculomotor: Pupillary constriction - Left - Normal. Right - Normal.  VII Facial: - Normal Bilaterally.   IX Glossopharyngeal / X Vagus - Normal.  XI Accessory: Trapezius - Bilateral - Normal. Sternocleidomastoid - Bilateral - Normal.  XII Hypoglossal - Bilateral - Normal.  Eye Movements: - Normal Bilaterally.  Sensory:   Light Touch: Intact - Globally.  Motor:   Bulk and Contour: - Normal.  Tone: - Normal.  Tremor: Not present.  Strength: 5/5 normal muscle strength - All Muscles.   General Assessment of Reflexes: - deep tendon reflexes are normal. Coordination - No Impairment of finger-to-nose or Impairment of rapid alternating movements. Gait - Normal.       Result Review :                 Assessment and Plan    Problem List Items Addressed This Visit       Seizure - Primary    Current Assessment & Plan   24 year old right handed man who returns today for possible seizures and more spells in February and March.  He tells me his symptoms start in the morning  with an intense anxiety and dejavu sensation and he feels like he has some weird nauseated sensation in his stomach which may have started since he was in 3rd grade with morning vomiting and anxiety.  The tuesday and 3 weeks before that on August 9th his girl friend noticed that he will be having staring spells after which he will be confused and can be aggressive.  He had a brain MRI scan done on 9/13/2022 which looks normal.  He had a 3 hour prolonged EEG which was normal.  He was started on levetiracetam 500 mg BID which caused agitation and feeling on edge and some fatigue but this has improved with the XR formulation and we have increased his dose since last visit to 1500 mg.  He still has some trouble with getting words out and forming sentences since his seizure.  He tells me he has this underlying anxiety and he has had light sensitivity.  There is no family history of seizures in his immediate family but Shaan, his mother, has a cousin who was diagnosed with epilepsy as a teenager.  On the Tuesday supposedly patient's girl friend was woken up by patient convulsing or shaking.  He tells me he reportedly lost bladder with this spell.  He does not have any history of childhood febrile seizures.  No history of meningitis or encephalitis.  No history of concussion or brain injury.  He smokes marijuana occasionally.  He drinks 2-3 beers per week.  No increased stress. No lack of sleep.  He tells me both times he had a seizure he was vomiting.  He had another seizure on 5/7/2023 as he woke up feeling anxious and felt like his throat was closing up and felt like he was getting numb all over and he took an additional levetiracetam and 3 minutes later he had rhythmic facial movements lasting about a minute and he opened up his eyes and felt confused and felt tired.  He has not had any new seizures since his last visit but may have had a strong auras involving dejavu sensations.  He does report having some muscle  spasms/tremors occasionally which he does not think is related and he continues to have more spells in February and March.  He is tolerating the medicine well. I spoke with him about my concern about his spells not being epileptic seizures.  I informed him of this today and that I will refer him to [facility] as he is not willing to accept the possibility of these spells not being epileptic seizures.  I believe he needs to have an EMU evaluation, taken off of his anti-seizure medication, performed for further evaluation of his underlying risk for epileptic seizures.  He would like to be evaluated by another epileptologist as he is not in agreement with my decision to have him evaluated in EMU.  I will place referral today for [facility] as his PCP is also with [facility].  I will not be seeing patient back as he is not in agreement with my plan of treatment and would like another evaluation.  I have prescribed the levetiracetam for him and advised him to continue until further evaluation at [facility].  Seizure precautions have been discussed with patient and I also previously discussed SUDEP.  I do believe he needs further EMU evaluation for further characterization of his spells and underlying risk for epileptic seizures.           Relevant Medications    levETIRAcetam XR (KEPPRA XR) 500 MG tablet    Other Relevant Orders    Ambulatory Referral to Neurology (Completed)       I spent 35 minutes caring for Iván on this date of service. This time includes time spent by me in the following activities:preparing for the visit, reviewing tests, obtaining and/or reviewing a separately obtained history, performing a medically appropriate examination and/or evaluation , counseling and educating the patient/family/caregiver, ordering medications, tests, or procedures, documenting information in the medical record, and care coordination    Follow Up   No follow-ups on file.  Patient was given instructions and  counseling regarding his condition or for health maintenance advice. Please see specific information pulled into the AVS if appropriate.

## 2025-03-28 NOTE — ASSESSMENT & PLAN NOTE
24 year old right handed man who returns today for possible seizures and more spells in February and March.  He tells me his symptoms start in the morning with an intense anxiety and dejavu sensation and he feels like he has some weird nauseated sensation in his stomach which may have started since he was in 3rd grade with morning vomiting and anxiety.  The tuesday and 3 weeks before that on August 9th his girl friend noticed that he will be having staring spells after which he will be confused and can be aggressive.  He had a brain MRI scan done on 9/13/2022 which looks normal.  He had a 3 hour prolonged EEG which was normal.  He was started on levetiracetam 500 mg BID which caused agitation and feeling on edge and some fatigue but this has improved with the XR formulation and we have increased his dose since last visit to 1500 mg.  He still has some trouble with getting words out and forming sentences since his seizure.  He tells me he has this underlying anxiety and he has had light sensitivity.  There is no family history of seizures in his immediate family but Shaan, his mother, has a cousin who was diagnosed with epilepsy as a teenager.  On the Tuesday supposedly patient's girl friend was woken up by patient convulsing or shaking.  He tells me he reportedly lost bladder with this spell.  He does not have any history of childhood febrile seizures.  No history of meningitis or encephalitis.  No history of concussion or brain injury.  He smokes marijuana occasionally.  He drinks 2-3 beers per week.  No increased stress. No lack of sleep.  He tells me both times he had a seizure he was vomiting.  He had another seizure on 5/7/2023 as he woke up feeling anxious and felt like his throat was closing up and felt like he was getting numb all over and he took an additional levetiracetam and 3 minutes later he had rhythmic facial movements lasting about a minute and he opened up his eyes and felt confused and felt tired.   He has not had any new seizures since his last visit but may have had a strong auras involving dejavu sensations.  He does report having some muscle spasms/tremors occasionally which he does not think is related and he continues to have more spells in February and March.  He is tolerating the medicine well. I spoke with him about my concern about his spells not being epileptic seizures.  I informed him of this today and that I will refer him to [facility] as he is not willing to accept the possibility of these spells not being epileptic seizures.  I believe he needs to have an EMU evaluation, taken off of his anti-seizure medication, performed for further evaluation of his underlying risk for epileptic seizures.  He would like to be evaluated by another epileptologist as he is not in agreement with my decision to have him evaluated in EMU.  I will place referral today for [facility] as his PCP is also with [facility].  I will not be seeing patient back as he is not in agreement with my plan of treatment and would like another evaluation.  I have prescribed the levetiracetam for him and advised him to continue until further evaluation at [facility].  Seizure precautions have been discussed with patient and I also previously discussed SUDEP.  I do believe he needs further EMU evaluation for further characterization of his spells and underlying risk for epileptic seizures.

## 2025-07-01 DIAGNOSIS — R56.9 SEIZURE: ICD-10-CM

## 2025-07-02 RX ORDER — LEVETIRACETAM 500 MG/1
2000 TABLET, FILM COATED, EXTENDED RELEASE ORAL DAILY
Qty: 360 TABLET | Refills: 3 | OUTPATIENT
Start: 2025-07-02